# Patient Record
Sex: MALE | Race: WHITE | HISPANIC OR LATINO | URBAN - METROPOLITAN AREA
[De-identification: names, ages, dates, MRNs, and addresses within clinical notes are randomized per-mention and may not be internally consistent; named-entity substitution may affect disease eponyms.]

---

## 2017-08-18 ENCOUNTER — ANESTHESIA EVENT (OUTPATIENT)
Dept: ENDOSCOPY | Facility: HOSPITAL | Age: 37
DRG: 432 | End: 2017-08-18
Payer: MEDICAID

## 2017-08-18 ENCOUNTER — HOSPITAL ENCOUNTER (INPATIENT)
Facility: HOSPITAL | Age: 37
LOS: 3 days | Discharge: HOME | DRG: 432 | End: 2017-08-21
Attending: EMERGENCY MEDICINE | Admitting: INTERNAL MEDICINE
Payer: MEDICAID

## 2017-08-18 ENCOUNTER — ANESTHESIA (OUTPATIENT)
Dept: ENDOSCOPY | Facility: HOSPITAL | Age: 37
DRG: 432 | End: 2017-08-18
Payer: MEDICAID

## 2017-08-18 ENCOUNTER — SURGERY (OUTPATIENT)
Age: 37
End: 2017-08-18

## 2017-08-18 DIAGNOSIS — K92.2 UGIB (UPPER GASTROINTESTINAL BLEED): ICD-10-CM

## 2017-08-18 DIAGNOSIS — K92.2 ACUTE UPPER GI BLEEDING: ICD-10-CM

## 2017-08-18 DIAGNOSIS — I86.4 BLEEDING GASTRIC VARICES: Primary | ICD-10-CM

## 2017-08-18 PROBLEM — E88.09 HYPOALBUMINEMIA: Status: ACTIVE | Noted: 2017-08-18

## 2017-08-18 PROBLEM — F10.10 ALCOHOL ABUSE: Status: ACTIVE | Noted: 2017-08-18

## 2017-08-18 PROBLEM — R73.9 HYPERGLYCEMIA: Status: ACTIVE | Noted: 2017-08-18

## 2017-08-18 PROBLEM — R79.1 ELEVATED INR: Status: ACTIVE | Noted: 2017-08-18

## 2017-08-18 PROBLEM — D62 ACUTE BLOOD LOSS ANEMIA: Status: ACTIVE | Noted: 2017-08-18

## 2017-08-18 PROBLEM — R74.01 ELEVATED AST (SGOT): Status: ACTIVE | Noted: 2017-08-18

## 2017-08-18 LAB
ABO + RH BLD: NORMAL
ALBUMIN SERPL BCP-MCNC: 2.5 G/DL
ALP SERPL-CCNC: 169 U/L
ALT SERPL W/O P-5'-P-CCNC: 24 U/L
ANION GAP SERPL CALC-SCNC: 19 MMOL/L
ANISOCYTOSIS BLD QL SMEAR: SLIGHT
APTT BLDCRRT: 30.4 SEC
AST SERPL-CCNC: 83 U/L
BASOPHILS # BLD AUTO: 0.03 K/UL
BASOPHILS # BLD AUTO: 0.04 K/UL
BASOPHILS # BLD AUTO: ABNORMAL K/UL
BASOPHILS NFR BLD: 0 %
BASOPHILS NFR BLD: 0.2 %
BASOPHILS NFR BLD: 0.2 %
BASOPHILS NFR BLD: 1 %
BILIRUB SERPL-MCNC: 1.3 MG/DL
BLD GP AB SCN CELLS X3 SERPL QL: NORMAL
BLD PROD TYP BPU: NORMAL
BLOOD UNIT EXPIRATION DATE: NORMAL
BLOOD UNIT TYPE CODE: 5100
BLOOD UNIT TYPE: NORMAL
BUN SERPL-MCNC: 10 MG/DL
CALCIUM SERPL-MCNC: 7.6 MG/DL
CHLORIDE SERPL-SCNC: 100 MMOL/L
CO2 SERPL-SCNC: 17 MMOL/L
CODING SYSTEM: NORMAL
CREAT SERPL-MCNC: 1.2 MG/DL
DIFFERENTIAL METHOD: ABNORMAL
DISPENSE STATUS: NORMAL
EOSINOPHIL # BLD AUTO: 0.1 K/UL
EOSINOPHIL # BLD AUTO: 0.2 K/UL
EOSINOPHIL # BLD AUTO: ABNORMAL K/UL
EOSINOPHIL NFR BLD: 0 %
EOSINOPHIL NFR BLD: 0 %
EOSINOPHIL NFR BLD: 0.3 %
EOSINOPHIL NFR BLD: 0.9 %
ERYTHROCYTE [DISTWIDTH] IN BLOOD BY AUTOMATED COUNT: 13.4 %
ERYTHROCYTE [DISTWIDTH] IN BLOOD BY AUTOMATED COUNT: 15.4 %
ERYTHROCYTE [DISTWIDTH] IN BLOOD BY AUTOMATED COUNT: 16.7 %
ERYTHROCYTE [DISTWIDTH] IN BLOOD BY AUTOMATED COUNT: 17.2 %
EST. GFR  (AFRICAN AMERICAN): >60 ML/MIN/1.73 M^2
EST. GFR  (NON AFRICAN AMERICAN): >60 ML/MIN/1.73 M^2
ESTIMATED AVG GLUCOSE: 120 MG/DL
FOLATE SERPL-MCNC: 5.8 NG/ML
GLUCOSE SERPL-MCNC: 230 MG/DL
HBA1C MFR BLD HPLC: 5.8 %
HCT VFR BLD AUTO: 22.5 %
HCT VFR BLD AUTO: 22.6 %
HCT VFR BLD AUTO: 24.1 %
HCT VFR BLD AUTO: 24.9 %
HGB BLD-MCNC: 7.2 G/DL
HGB BLD-MCNC: 7.4 G/DL
HGB BLD-MCNC: 8.1 G/DL
HGB BLD-MCNC: 8.1 G/DL
HYPOCHROMIA BLD QL SMEAR: ABNORMAL
INR PPP: 1.4
LIPASE SERPL-CCNC: 28 U/L
LYMPHOCYTES # BLD AUTO: 2.6 K/UL
LYMPHOCYTES # BLD AUTO: 2.7 K/UL
LYMPHOCYTES # BLD AUTO: ABNORMAL K/UL
LYMPHOCYTES NFR BLD: 11 %
LYMPHOCYTES NFR BLD: 11 %
LYMPHOCYTES NFR BLD: 14.9 %
LYMPHOCYTES NFR BLD: 16.5 %
MCH RBC QN AUTO: 31.9 PG
MCH RBC QN AUTO: 32.5 PG
MCH RBC QN AUTO: 33 PG
MCH RBC QN AUTO: 34.3 PG
MCHC RBC AUTO-ENTMCNC: 31.9 G/DL
MCHC RBC AUTO-ENTMCNC: 32.5 G/DL
MCHC RBC AUTO-ENTMCNC: 32.9 G/DL
MCHC RBC AUTO-ENTMCNC: 33.6 G/DL
MCV RBC AUTO: 100 FL
MCV RBC AUTO: 108 FL
MCV RBC AUTO: 97 FL
MCV RBC AUTO: 98 FL
METAMYELOCYTES NFR BLD MANUAL: 1 %
MONOCYTES # BLD AUTO: 1.4 K/UL
MONOCYTES # BLD AUTO: 1.4 K/UL
MONOCYTES # BLD AUTO: ABNORMAL K/UL
MONOCYTES NFR BLD: 2 %
MONOCYTES NFR BLD: 5 %
MONOCYTES NFR BLD: 8.1 %
MONOCYTES NFR BLD: 8.8 %
MYELOCYTES NFR BLD MANUAL: 1 %
NEUTROPHILS # BLD AUTO: 11.8 K/UL
NEUTROPHILS # BLD AUTO: 13.5 K/UL
NEUTROPHILS NFR BLD: 73.6 %
NEUTROPHILS NFR BLD: 74 %
NEUTROPHILS NFR BLD: 76.5 %
NEUTROPHILS NFR BLD: 79 %
NEUTS BAND NFR BLD MANUAL: 10 %
NEUTS BAND NFR BLD MANUAL: 5 %
PLATELET # BLD AUTO: 114 K/UL
PLATELET # BLD AUTO: 117 K/UL
PLATELET # BLD AUTO: 131 K/UL
PLATELET # BLD AUTO: 228 K/UL
PLATELET BLD QL SMEAR: ABNORMAL
PMV BLD AUTO: 10.1 FL
PMV BLD AUTO: 9.7 FL
PMV BLD AUTO: 9.8 FL
PMV BLD AUTO: 9.9 FL
POCT GLUCOSE: 159 MG/DL (ref 70–110)
POCT GLUCOSE: 197 MG/DL (ref 70–110)
POLYCHROMASIA BLD QL SMEAR: ABNORMAL
POTASSIUM SERPL-SCNC: 4.2 MMOL/L
PROT SERPL-MCNC: 6.1 G/DL
PROTHROMBIN TIME: 14.6 SEC
RBC # BLD AUTO: 2.1 M/UL
RBC # BLD AUTO: 2.24 M/UL
RBC # BLD AUTO: 2.49 M/UL
RBC # BLD AUTO: 2.54 M/UL
SODIUM SERPL-SCNC: 136 MMOL/L
TRANS ERYTHROCYTES VOL PATIENT: NORMAL ML
VIT B12 SERPL-MCNC: 878 PG/ML
WBC # BLD AUTO: 16.02 K/UL
WBC # BLD AUTO: 17.65 K/UL
WBC # BLD AUTO: 21.36 K/UL
WBC # BLD AUTO: 27.75 K/UL

## 2017-08-18 PROCEDURE — 63600175 PHARM REV CODE 636 W HCPCS: Performed by: INTERNAL MEDICINE

## 2017-08-18 PROCEDURE — 80053 COMPREHEN METABOLIC PANEL: CPT

## 2017-08-18 PROCEDURE — 85027 COMPLETE CBC AUTOMATED: CPT | Mod: 91

## 2017-08-18 PROCEDURE — 37000009 HC ANESTHESIA EA ADD 15 MINS: Performed by: INTERNAL MEDICINE

## 2017-08-18 PROCEDURE — 25000003 PHARM REV CODE 250: Performed by: NURSE ANESTHETIST, CERTIFIED REGISTERED

## 2017-08-18 PROCEDURE — 96365 THER/PROPH/DIAG IV INF INIT: CPT

## 2017-08-18 PROCEDURE — 27201022: Performed by: INTERNAL MEDICINE

## 2017-08-18 PROCEDURE — 93010 ELECTROCARDIOGRAM REPORT: CPT | Mod: ,,, | Performed by: INTERNAL MEDICINE

## 2017-08-18 PROCEDURE — 25000003 PHARM REV CODE 250: Performed by: EMERGENCY MEDICINE

## 2017-08-18 PROCEDURE — 25000003 PHARM REV CODE 250: Performed by: INTERNAL MEDICINE

## 2017-08-18 PROCEDURE — C9113 INJ PANTOPRAZOLE SODIUM, VIA: HCPCS | Performed by: INTERNAL MEDICINE

## 2017-08-18 PROCEDURE — 21400001 HC TELEMETRY ROOM

## 2017-08-18 PROCEDURE — 25000003 PHARM REV CODE 250: Performed by: HOSPITALIST

## 2017-08-18 PROCEDURE — 86900 BLOOD TYPING SEROLOGIC ABO: CPT

## 2017-08-18 PROCEDURE — 06L34CZ OCCLUSION OF ESOPHAGEAL VEIN WITH EXTRALUMINAL DEVICE, PERCUTANEOUS ENDOSCOPIC APPROACH: ICD-10-PCS | Performed by: INTERNAL MEDICINE

## 2017-08-18 PROCEDURE — 37000008 HC ANESTHESIA 1ST 15 MINUTES: Performed by: INTERNAL MEDICINE

## 2017-08-18 PROCEDURE — 96375 TX/PRO/DX INJ NEW DRUG ADDON: CPT

## 2017-08-18 PROCEDURE — 36430 TRANSFUSION BLD/BLD COMPNT: CPT

## 2017-08-18 PROCEDURE — 86850 RBC ANTIBODY SCREEN: CPT

## 2017-08-18 PROCEDURE — 96361 HYDRATE IV INFUSION ADD-ON: CPT

## 2017-08-18 PROCEDURE — 99285 EMERGENCY DEPT VISIT HI MDM: CPT | Mod: 25

## 2017-08-18 PROCEDURE — 63600175 PHARM REV CODE 636 W HCPCS: Performed by: HOSPITALIST

## 2017-08-18 PROCEDURE — 86803 HEPATITIS C AB TEST: CPT

## 2017-08-18 PROCEDURE — 86920 COMPATIBILITY TEST SPIN: CPT

## 2017-08-18 PROCEDURE — P9021 RED BLOOD CELLS UNIT: HCPCS

## 2017-08-18 PROCEDURE — 85610 PROTHROMBIN TIME: CPT

## 2017-08-18 PROCEDURE — 82607 VITAMIN B-12: CPT

## 2017-08-18 PROCEDURE — 96368 THER/DIAG CONCURRENT INF: CPT

## 2017-08-18 PROCEDURE — D9220A PRA ANESTHESIA: Mod: CRNA,,, | Performed by: NURSE ANESTHETIST, CERTIFIED REGISTERED

## 2017-08-18 PROCEDURE — C9113 INJ PANTOPRAZOLE SODIUM, VIA: HCPCS | Performed by: EMERGENCY MEDICINE

## 2017-08-18 PROCEDURE — 63600175 PHARM REV CODE 636 W HCPCS: Performed by: EMERGENCY MEDICINE

## 2017-08-18 PROCEDURE — 85730 THROMBOPLASTIN TIME PARTIAL: CPT

## 2017-08-18 PROCEDURE — 85025 COMPLETE CBC W/AUTO DIFF WBC: CPT

## 2017-08-18 PROCEDURE — 82746 ASSAY OF FOLIC ACID SERUM: CPT

## 2017-08-18 PROCEDURE — 36415 COLL VENOUS BLD VENIPUNCTURE: CPT

## 2017-08-18 PROCEDURE — 63600175 PHARM REV CODE 636 W HCPCS: Performed by: NURSE ANESTHETIST, CERTIFIED REGISTERED

## 2017-08-18 PROCEDURE — D9220A PRA ANESTHESIA: Mod: ANES,,, | Performed by: ANESTHESIOLOGY

## 2017-08-18 PROCEDURE — 83690 ASSAY OF LIPASE: CPT

## 2017-08-18 PROCEDURE — 94760 N-INVAS EAR/PLS OXIMETRY 1: CPT

## 2017-08-18 PROCEDURE — 43255 EGD CONTROL BLEEDING ANY: CPT | Performed by: INTERNAL MEDICINE

## 2017-08-18 PROCEDURE — 87340 HEPATITIS B SURFACE AG IA: CPT

## 2017-08-18 PROCEDURE — 85007 BL SMEAR W/DIFF WBC COUNT: CPT | Mod: 91

## 2017-08-18 PROCEDURE — 83036 HEMOGLOBIN GLYCOSYLATED A1C: CPT

## 2017-08-18 RX ORDER — LIDOCAINE HCL/PF 100 MG/5ML
SYRINGE (ML) INTRAVENOUS
Status: DISCONTINUED | OUTPATIENT
Start: 2017-08-18 | End: 2017-08-18

## 2017-08-18 RX ORDER — DEXTROSE MONOHYDRATE AND SODIUM CHLORIDE 5; .9 G/100ML; G/100ML
INJECTION, SOLUTION INTRAVENOUS CONTINUOUS
Status: DISCONTINUED | OUTPATIENT
Start: 2017-08-18 | End: 2017-08-18

## 2017-08-18 RX ORDER — PANTOPRAZOLE SODIUM 40 MG/10ML
80 INJECTION, POWDER, LYOPHILIZED, FOR SOLUTION INTRAVENOUS
Status: COMPLETED | OUTPATIENT
Start: 2017-08-18 | End: 2017-08-18

## 2017-08-18 RX ORDER — SUCRALFATE 1 G/10ML
1 SUSPENSION ORAL EVERY 6 HOURS
Status: DISCONTINUED | OUTPATIENT
Start: 2017-08-18 | End: 2017-08-21 | Stop reason: HOSPADM

## 2017-08-18 RX ORDER — ROCURONIUM BROMIDE 10 MG/ML
INJECTION, SOLUTION INTRAVENOUS
Status: DISPENSED
Start: 2017-08-18 | End: 2017-08-18

## 2017-08-18 RX ORDER — HYDROCODONE BITARTRATE AND ACETAMINOPHEN 500; 5 MG/1; MG/1
TABLET ORAL
Status: DISCONTINUED | OUTPATIENT
Start: 2017-08-18 | End: 2017-08-21 | Stop reason: HOSPADM

## 2017-08-18 RX ORDER — PHENYLEPHRINE HYDROCHLORIDE 10 MG/ML
INJECTION INTRAVENOUS
Status: DISCONTINUED | OUTPATIENT
Start: 2017-08-18 | End: 2017-08-18

## 2017-08-18 RX ORDER — SUCCINYLCHOLINE CHLORIDE 20 MG/ML
INJECTION INTRAMUSCULAR; INTRAVENOUS
Status: DISCONTINUED | OUTPATIENT
Start: 2017-08-18 | End: 2017-08-18

## 2017-08-18 RX ORDER — ONDANSETRON 2 MG/ML
4 INJECTION INTRAMUSCULAR; INTRAVENOUS
Status: COMPLETED | OUTPATIENT
Start: 2017-08-18 | End: 2017-08-18

## 2017-08-18 RX ORDER — ROCURONIUM BROMIDE 10 MG/ML
INJECTION, SOLUTION INTRAVENOUS
Status: DISCONTINUED | OUTPATIENT
Start: 2017-08-18 | End: 2017-08-18

## 2017-08-18 RX ORDER — MIDAZOLAM HYDROCHLORIDE 1 MG/ML
INJECTION INTRAMUSCULAR; INTRAVENOUS
Status: DISPENSED
Start: 2017-08-18 | End: 2017-08-18

## 2017-08-18 RX ORDER — GLYCOPYRROLATE 0.2 MG/ML
INJECTION INTRAMUSCULAR; INTRAVENOUS
Status: DISCONTINUED | OUTPATIENT
Start: 2017-08-18 | End: 2017-08-18

## 2017-08-18 RX ORDER — ETOMIDATE 2 MG/ML
INJECTION INTRAVENOUS
Status: DISCONTINUED | OUTPATIENT
Start: 2017-08-18 | End: 2017-08-18

## 2017-08-18 RX ORDER — NEOSTIGMINE METHYLSULFATE 1 MG/ML
INJECTION, SOLUTION INTRAVENOUS
Status: DISCONTINUED | OUTPATIENT
Start: 2017-08-18 | End: 2017-08-18

## 2017-08-18 RX ORDER — ONDANSETRON 2 MG/ML
8 INJECTION INTRAMUSCULAR; INTRAVENOUS EVERY 8 HOURS PRN
Status: DISCONTINUED | OUTPATIENT
Start: 2017-08-18 | End: 2017-08-21 | Stop reason: HOSPADM

## 2017-08-18 RX ORDER — MIDAZOLAM HYDROCHLORIDE 1 MG/ML
INJECTION, SOLUTION INTRAMUSCULAR; INTRAVENOUS
Status: DISCONTINUED | OUTPATIENT
Start: 2017-08-18 | End: 2017-08-18

## 2017-08-18 RX ORDER — GLYCOPYRROLATE 0.2 MG/ML
INJECTION INTRAMUSCULAR; INTRAVENOUS
Status: DISPENSED
Start: 2017-08-18 | End: 2017-08-18

## 2017-08-18 RX ORDER — SUCRALFATE 1 G/10ML
1 SUSPENSION ORAL
Status: DISCONTINUED | OUTPATIENT
Start: 2017-08-18 | End: 2017-08-18

## 2017-08-18 RX ORDER — DIAZEPAM 10 MG/2ML
5 INJECTION INTRAMUSCULAR EVERY 6 HOURS PRN
Status: DISCONTINUED | OUTPATIENT
Start: 2017-08-18 | End: 2017-08-21 | Stop reason: HOSPADM

## 2017-08-18 RX ORDER — DIPHENHYDRAMINE HYDROCHLORIDE 50 MG/ML
25 INJECTION INTRAMUSCULAR; INTRAVENOUS
Status: COMPLETED | OUTPATIENT
Start: 2017-08-18 | End: 2017-08-18

## 2017-08-18 RX ORDER — LABETALOL HYDROCHLORIDE 5 MG/ML
INJECTION, SOLUTION INTRAVENOUS
Status: DISCONTINUED | OUTPATIENT
Start: 2017-08-18 | End: 2017-08-18

## 2017-08-18 RX ORDER — SUCCINYLCHOLINE CHLORIDE 20 MG/ML
INJECTION INTRAMUSCULAR; INTRAVENOUS
Status: DISPENSED
Start: 2017-08-18 | End: 2017-08-18

## 2017-08-18 RX ORDER — SODIUM CHLORIDE 9 MG/ML
INJECTION, SOLUTION INTRAVENOUS CONTINUOUS PRN
Status: DISCONTINUED | OUTPATIENT
Start: 2017-08-18 | End: 2017-08-18

## 2017-08-18 RX ORDER — ETOMIDATE 2 MG/ML
INJECTION INTRAVENOUS
Status: DISPENSED
Start: 2017-08-18 | End: 2017-08-18

## 2017-08-18 RX ORDER — GLUCAGON 1 MG
1 KIT INJECTION
Status: DISCONTINUED | OUTPATIENT
Start: 2017-08-18 | End: 2017-08-21 | Stop reason: HOSPADM

## 2017-08-18 RX ORDER — LIDOCAINE HYDROCHLORIDE 20 MG/ML
INJECTION, SOLUTION EPIDURAL; INFILTRATION; INTRACAUDAL; PERINEURAL
Status: DISPENSED
Start: 2017-08-18 | End: 2017-08-18

## 2017-08-18 RX ORDER — NEOSTIGMINE METHYLSULFATE 1 MG/ML
INJECTION, SOLUTION INTRAVENOUS
Status: DISPENSED
Start: 2017-08-18 | End: 2017-08-18

## 2017-08-18 RX ORDER — LABETALOL HYDROCHLORIDE 5 MG/ML
INJECTION, SOLUTION INTRAVENOUS
Status: DISPENSED
Start: 2017-08-18 | End: 2017-08-18

## 2017-08-18 RX ORDER — PHENYLEPHRINE HCL IN 0.9% NACL 1 MG/10 ML
SYRINGE (ML) INTRAVENOUS
Status: DISCONTINUED
Start: 2017-08-18 | End: 2017-08-18 | Stop reason: WASHOUT

## 2017-08-18 RX ORDER — PANTOPRAZOLE SODIUM 40 MG/10ML
40 INJECTION, POWDER, LYOPHILIZED, FOR SOLUTION INTRAVENOUS 2 TIMES DAILY
Status: DISCONTINUED | OUTPATIENT
Start: 2017-08-18 | End: 2017-08-21 | Stop reason: HOSPADM

## 2017-08-18 RX ORDER — SUCRALFATE 1 G/10ML
1 SUSPENSION ORAL EVERY 6 HOURS
Status: DISCONTINUED | OUTPATIENT
Start: 2017-08-18 | End: 2017-08-18

## 2017-08-18 RX ORDER — SODIUM CHLORIDE 0.9 % (FLUSH) 0.9 %
3 SYRINGE (ML) INJECTION
Status: DISCONTINUED | OUTPATIENT
Start: 2017-08-18 | End: 2017-08-18

## 2017-08-18 RX ORDER — PHENYLEPHRINE HCL IN 0.9% NACL 1 MG/10 ML
SYRINGE (ML) INTRAVENOUS
Status: DISPENSED
Start: 2017-08-18 | End: 2017-08-18

## 2017-08-18 RX ORDER — SODIUM CHLORIDE 9 MG/ML
INJECTION, SOLUTION INTRAVENOUS CONTINUOUS
Status: DISCONTINUED | OUTPATIENT
Start: 2017-08-18 | End: 2017-08-21 | Stop reason: HOSPADM

## 2017-08-18 RX ORDER — INSULIN ASPART 100 [IU]/ML
0-5 INJECTION, SOLUTION INTRAVENOUS; SUBCUTANEOUS EVERY 6 HOURS PRN
Status: DISCONTINUED | OUTPATIENT
Start: 2017-08-18 | End: 2017-08-21 | Stop reason: HOSPADM

## 2017-08-18 RX ORDER — ONDANSETRON 2 MG/ML
4 INJECTION INTRAMUSCULAR; INTRAVENOUS EVERY 8 HOURS PRN
Status: DISCONTINUED | OUTPATIENT
Start: 2017-08-18 | End: 2017-08-18

## 2017-08-18 RX ADMIN — LIDOCAINE HYDROCHLORIDE 100 MG: 20 INJECTION, SOLUTION INTRAVENOUS at 02:08

## 2017-08-18 RX ADMIN — PHENYLEPHRINE HYDROCHLORIDE 300 MCG: 10 INJECTION INTRAVENOUS at 03:08

## 2017-08-18 RX ADMIN — PHENYLEPHRINE HYDROCHLORIDE 200 MCG: 10 INJECTION INTRAVENOUS at 03:08

## 2017-08-18 RX ADMIN — ROCURONIUM BROMIDE 5 MG: 10 INJECTION, SOLUTION INTRAVENOUS at 02:08

## 2017-08-18 RX ADMIN — SODIUM CHLORIDE: 0.9 INJECTION, SOLUTION INTRAVENOUS at 02:08

## 2017-08-18 RX ADMIN — ETOMIDATE 16 MG: 2 INJECTION, SOLUTION INTRAVENOUS at 02:08

## 2017-08-18 RX ADMIN — ROCURONIUM BROMIDE 5 MG: 10 INJECTION, SOLUTION INTRAVENOUS at 03:08

## 2017-08-18 RX ADMIN — ROCURONIUM BROMIDE 15 MG: 10 INJECTION, SOLUTION INTRAVENOUS at 02:08

## 2017-08-18 RX ADMIN — SODIUM CHLORIDE: 0.9 INJECTION, SOLUTION INTRAVENOUS at 11:08

## 2017-08-18 RX ADMIN — LABETALOL HYDROCHLORIDE 10 MG: 5 INJECTION, SOLUTION INTRAVENOUS at 03:08

## 2017-08-18 RX ADMIN — DIAZEPAM 5 MG: 5 INJECTION, SOLUTION INTRAMUSCULAR; INTRAVENOUS at 11:08

## 2017-08-18 RX ADMIN — SODIUM CHLORIDE: 0.9 INJECTION, SOLUTION INTRAVENOUS at 04:08

## 2017-08-18 RX ADMIN — SODIUM CHLORIDE 1000 ML: 9 INJECTION, SOLUTION INTRAVENOUS at 12:08

## 2017-08-18 RX ADMIN — PANTOPRAZOLE SODIUM 8 MG/HR: 40 INJECTION, POWDER, FOR SOLUTION INTRAVENOUS at 06:08

## 2017-08-18 RX ADMIN — FOLIC ACID: 5 INJECTION, SOLUTION INTRAMUSCULAR; INTRAVENOUS; SUBCUTANEOUS at 09:08

## 2017-08-18 RX ADMIN — PHENYLEPHRINE HYDROCHLORIDE 100 MCG: 10 INJECTION INTRAVENOUS at 03:08

## 2017-08-18 RX ADMIN — MIDAZOLAM HYDROCHLORIDE 2 MG: 1 INJECTION, SOLUTION INTRAMUSCULAR; INTRAVENOUS at 02:08

## 2017-08-18 RX ADMIN — PANTOPRAZOLE SODIUM 80 MG: 40 INJECTION, POWDER, FOR SOLUTION INTRAVENOUS at 01:08

## 2017-08-18 RX ADMIN — SUCRALFATE 1 G: 1 SUSPENSION ORAL at 11:08

## 2017-08-18 RX ADMIN — NEOSTIGMINE METHYLSULFATE 5 MG: 1 INJECTION INTRAVENOUS at 03:08

## 2017-08-18 RX ADMIN — OCTREOTIDE ACETATE 50 MCG/HR: 500 INJECTION, SOLUTION INTRAVENOUS; SUBCUTANEOUS at 02:08

## 2017-08-18 RX ADMIN — PANTOPRAZOLE SODIUM 40 MG: 40 INJECTION, POWDER, FOR SOLUTION INTRAVENOUS at 08:08

## 2017-08-18 RX ADMIN — PANTOPRAZOLE SODIUM 40 MG: 40 INJECTION, POWDER, FOR SOLUTION INTRAVENOUS at 09:08

## 2017-08-18 RX ADMIN — PHENYLEPHRINE HYDROCHLORIDE 400 MCG: 10 INJECTION INTRAVENOUS at 03:08

## 2017-08-18 RX ADMIN — OCTREOTIDE ACETATE 50 MCG/HR: 500 INJECTION, SOLUTION INTRAVENOUS; SUBCUTANEOUS at 01:08

## 2017-08-18 RX ADMIN — SUCRALFATE 1 G: 1 SUSPENSION ORAL at 05:08

## 2017-08-18 RX ADMIN — ONDANSETRON 4 MG: 2 INJECTION INTRAMUSCULAR; INTRAVENOUS at 12:08

## 2017-08-18 RX ADMIN — DIPHENHYDRAMINE HYDROCHLORIDE 25 MG: 50 INJECTION, SOLUTION INTRAMUSCULAR; INTRAVENOUS at 12:08

## 2017-08-18 RX ADMIN — PANTOPRAZOLE SODIUM 8 MG/HR: 40 INJECTION, POWDER, FOR SOLUTION INTRAVENOUS at 04:08

## 2017-08-18 RX ADMIN — SUCCINYLCHOLINE CHLORIDE 120 MG: 20 INJECTION, SOLUTION INTRAMUSCULAR; INTRAVENOUS at 02:08

## 2017-08-18 RX ADMIN — GLYCOPYRROLATE 0.6 MG: 0.2 INJECTION, SOLUTION INTRAMUSCULAR; INTRAVENOUS at 03:08

## 2017-08-18 RX ADMIN — PANTOPRAZOLE SODIUM 8 MG/HR: 40 INJECTION, POWDER, FOR SOLUTION INTRAVENOUS at 01:08

## 2017-08-18 RX ADMIN — CEFTRIAXONE 1 G: 1 INJECTION, SOLUTION INTRAVENOUS at 04:08

## 2017-08-18 RX ADMIN — SUCRALFATE 1 G: 1 SUSPENSION ORAL at 08:08

## 2017-08-18 NOTE — H&P
"[facility] Hospital Medicine  History & Physical    Patient Name: Alcides Mac  MRN: [id number]  Admission Date: 08/18/2017  Attending Physician: Matheus Denton MD, MPH      PCP:     Primary Doctor No    CC:     Chief Complaint   Patient presents with    Hematemesis     x 1 hour, pt reports weak and dizzy, EMS report they saw a dark color in bucket upon arrival        HISTORY OF PRESENT ILLNESS:     Alcides Mac is a 36 y.o. male that (in part)  has no past medical history on file. Presents to [facility] Emergency Department complaining of dizziness, weakness, and fatigue.  The history is otherwise limited due to the condition of the patient due to somnolence after anesthesia from emergent EGD.  History was primarily taken from the emergency department physician who reports the patient presented with acute onset of nausea and hematemesis that began yesterday.  He had several episodes of hematemesis that continue "every 5 minutes".  This is been spontaneous and was not first overall episode.  He does report having at least 2-3 beers per day.  No known history of gastric varices or peptic ulcers.    In the emergency department routine laboratory studies were obtained.  He was also found to be tachycardic and hypotensive.  Subsequently had an episode of hematemesis in the ED.  An OG tube was placed and returned about 150cc dark red blood.  In addition, the patient had about 400cc dark red hematemesis after placement of the G-tube.  He also had an additional 150cc of dark red blood from stool that appeared to be melanotic.  Gastroenterology was emergently consulted and the patient was taken for EGD.  Operative report pending but preliminary thoughts are that the bleeding is due to gastric varices which were banded.    Hospital medicine has been asked to admit for further evaluation and treatment.       REVIEW OF SYSTEMS:     The available review of system is " addressed in the HPI and is otherwise limited due to the condition of the patient status post anesthesia.       PAST MEDICAL / SURGICAL HISTORY:     Unable to obtain due to somnolence status post anesthesia.    FAMILY HISTORY:     Unable to obtain due to somnolence status post anesthesia.      SOCIAL HISTORY:     Social History   Substance Use Topics    Smoking status: Never Smoker    Smokeless tobacco: Never Used    Alcohol use 1.2 - 1.8 oz/week     2 - 3 Cans of beer per week      Comment: per day     Social History     Social History    Marital status:      Spouse name: N/A    Number of children: N/A    Years of education: N/A     Social History Main Topics    Smoking status: Never Smoker    Smokeless tobacco: Never Used    Alcohol use 1.2 - 1.8 oz/week     2 - 3 Cans of beer per week      Comment: per day    Drug use: No    Sexual activity: Not Asked     Other Topics Concern    None     Social History Narrative    None         ALLERGIES:       Review of patient's allergies indicates:  No Known Allergies        HOME MEDICATIONS:     Prior to Admission medications    Not on File      Unable to obtain due to somnolence status post anesthesia.        HOSPITAL MEDICATIONS:     Scheduled Meds:    cefTRIAXone (ROCEPHIN) IVPB  1 g Intravenous Q24H    etomidate        glycopyrrolate        labetalol        lidocaine  20 mg/mL (2%)        midazolam        neostigmine        phenylephrine HCl in 0.9% NaCl        rocuronium        Banana bag   Intravenous Daily    succinylcholine         Continuous Infusions:    sodium chloride 0.9%      dextrose 5 % and 0.9 % NaCl      octreotide (SANDOSTATIN) infusion 50 mcg/hr (08/18/17 020)    pantoprazole 40 mg in dextrose 5 % 100 mL infusion (ready to mix system) 8 mg/hr (08/18/17 7211)     PRN Meds: sodium chloride, dextrose 50%, glucagon (human recombinant), insulin aspart, ondansetron, sodium chloride 0.9%      PHYSICAL EXAM:     Wt Readings from  Last 1 Encounters:   08/18/17 0500 95.6 kg (210 lb 12.2 oz)   08/18/17 0235 113.4 kg (250 lb)   08/18/17 0003 113.4 kg (250 lb)     There is no height or weight on file to calculate BMI.  Vitals:    08/18/17 0500 08/18/17 0506 08/18/17 0521 08/18/17 0530   BP: 110/61 110/63 (!) 107/59    BP Location:       Patient Position:       Pulse: 100 100     Resp: (!) 36 (!) 22     Temp: 99 °F (37.2 °C) 99 °F (37.2 °C) 98.8 °F (37.1 °C)    TempSrc: Oral Oral Oral    SpO2: 100%   100%   Weight: 95.6 kg (210 lb 12.2 oz)             -- General appearance: Early middle-aged  male who is well developed. appears somnolent.  Appears stated age   -- Head: normocephalic, atraumatic   -- Eyes: conjunctivae clear. Extraocular muscles intact  -- Nose: Nares normal. Septum midline.   -- Mouth/Throat: lips, mucosa, and tongue normal. no throat erythema.   -- Neck: supple, symmetrical, trachea midline, no JVD and thyroid not grossly enlarged, appears symmetric  -- Lungs: clear to auscultation bilaterally. normal respiratory effort. No use of accessory muscles.   -- Chest wall: no tenderness. equal bilateral chest rise   -- Heart: Rapid rate and regular rhythm. S1, S2 normal.  no click, rub or gallop   -- Abdomen: Mild epigastric tenderness.  soft, non-distended, non-tympanic; bowel sounds normal; no masses  -- Extremities: no cyanosis, clubbing or edema.   -- Pulses: 2+ and symmetric   -- Skin: color normal, texture normal, turgor normal. No rashes or lesions.   -- Neurologic: Somnolent.  good muscle tone. No focal numbness or weakness. CNII-XII intact.     LABORATORY STUDIES:     Recent Results (from the past 36 hour(s))   CBC auto differential    Collection Time: 08/18/17 12:29 AM   Result Value Ref Range    WBC 27.75 (H) 3.90 - 12.70 K/uL    RBC 2.10 (L) 4.60 - 6.20 M/uL    Hemoglobin 7.2 (L) 14.0 - 18.0 g/dL    Hematocrit 22.6 (L) 40.0 - 54.0 %     (H) 82 - 98 fL    MCH 34.3 (H) 27.0 - 31.0 pg    MCHC 31.9 (L) 32.0 - 36.0  g/dL    RDW 13.4 11.5 - 14.5 %    Platelets 228 150 - 350 K/uL    MPV 9.8 9.2 - 12.9 fL    Gran% 74.0 (H) 38.0 - 73.0 %    Lymph% 11.0 (L) 18.0 - 48.0 %    Mono% 2.0 (L) 4.0 - 15.0 %    Eosinophil% 0.0 0.0 - 8.0 %    Basophil% 1.0 0.0 - 1.9 %    Bands 10.0 %    Metamyelocytes 1.0 %    Myelocytes 1.0 %    Aniso Slight     Poly Occasional     Hypo Occasional     Differential Method Manual    Comprehensive metabolic panel    Collection Time: 08/18/17 12:29 AM   Result Value Ref Range    Sodium 136 136 - 145 mmol/L    Potassium 4.2 3.5 - 5.1 mmol/L    Chloride 100 95 - 110 mmol/L    CO2 17 (L) 23 - 29 mmol/L    Glucose 230 (H) 70 - 110 mg/dL    BUN, Bld 10 6 - 20 mg/dL    Creatinine 1.2 0.5 - 1.4 mg/dL    Calcium 7.6 (L) 8.7 - 10.5 mg/dL    Total Protein 6.1 6.0 - 8.4 g/dL    Albumin 2.5 (L) 3.5 - 5.2 g/dL    Total Bilirubin 1.3 (H) 0.1 - 1.0 mg/dL    Alkaline Phosphatase 169 (H) 55 - 135 U/L    AST 83 (H) 10 - 40 U/L    ALT 24 10 - 44 U/L    Anion Gap 19 (H) 8 - 16 mmol/L    eGFR if African American >60 >60 mL/min/1.73 m^2    eGFR if non African American >60 >60 mL/min/1.73 m^2   Lipase    Collection Time: 08/18/17 12:29 AM   Result Value Ref Range    Lipase 28 4 - 60 U/L   APTT    Collection Time: 08/18/17 12:29 AM   Result Value Ref Range    aPTT 30.4 21.0 - 32.0 sec   Protime-INR    Collection Time: 08/18/17 12:29 AM   Result Value Ref Range    Prothrombin Time 14.6 (H) 9.0 - 12.5 sec    INR 1.4 (H) 0.8 - 1.2   Type & Screen    Collection Time: 08/18/17 12:30 AM   Result Value Ref Range    Group & Rh O POS     Indirect Emelyn NEG    Prepare RBC 2 Units; hemorrhagic shock    Collection Time: 08/18/17 12:30 AM   Result Value Ref Range    UNIT NUMBER [id number]     PRODUCT CODE T0412U19     DISPENSE STATUS ISSUED     CODING SYSTEM LNHF328     Unit Blood Type Code 5100     Unit Blood Type O POS     Unit Expiration [id number]     UNIT NUMBER [id number]     PRODUCT CODE M6151C45     DISPENSE STATUS ISSUED      CODING SYSTEM SBFS883     Unit Blood Type Code 5100     Unit Blood Type O POS     Unit Expiration [id number]        Lab Results   Component Value Date    INR 1.4 (H) 08/18/2017     No results found for: HGBA1C        IMAGING:     Imaging Results          X-Ray Abdomen AP 1 View (Final result)  Result time 08/18/17 02:52:42   Procedure changed from X-Ray Abdomen Flat And Erect     Final result by Hans Alegria MD (08/18/17 02:52:42)                 Impression:        As above.      Electronically signed by: HANS ALEGRIA MD  Date:     08/18/17  Time:    02:52              Narrative:    AP abdomen single view.  Comparison: None.    Enteric tube visualized extending well below the diaphragm.  Nonspecific bowel gas pattern.  No convincing evidence of obstruction.  No free air visualized.                             X-Ray Chest AP Portable (Final result)  Result time 08/18/17 02:06:20   Procedure changed from X-Ray Chest PA And Lateral     Final result by Hans Alegria MD (08/18/17 02:06:20)                 Impression:      No acute cardiopulmonary process identified.      Electronically signed by: HANS ALEGRIA MD  Date:     08/18/17  Time:    02:06              Narrative:    Chest AP single view.  Comparison: None.    Enteric tube extends below the diaphragm outside the field-of-view.  Cardiac silhouette is normal in size.  Lungs are symmetrically expanded.  No evidence of focal consolidative process, pneumothorax, or significant effusion.  Bones appear intact.  No free air visualized beneath the diaphragm.                                CONSULTS:     IP CONSULT TO GI       ASSESSMENT & PLAN:     Primary Diagnosis:  Acute upper GI bleeding    Active Hospital Problems    Diagnosis  POA    *Acute upper GI bleeding [K92.2]  Yes     Priority: 1 - High    Bleeding gastric varices [I86.4]  Yes     Priority: 2     Acute blood loss anemia [D62]  Yes     Priority: 3     Elevated INR [R79.1]  Yes    Elevated AST  (SGOT) [R74.0]  Yes    Hypoalbuminemia [E88.09]  Yes    Hyperglycemia [R73.9]  Yes    Alcohol abuse [F10.10]  Yes      Resolved Hospital Problems    Diagnosis Date Resolved POA   No resolved problems to display.         Alcohol abuse  Suspected chronic alcohol abuse based upon history, elevated AST, gastric varices, and hypoalbuminemia.  · Initiate banana bag  · Case management consult to provide community resources for substance abuse    Acute upper GI bleeding  Acute Upper GI bleeding  · As evidence by physical exam and positive occult blood testing  · Possible etiology includes: hemorrhagic gastritis, ulcer (duodenal / gastric), gastric varices, malignancy, Shruthi Agustin tear  · Monitor with serial H&H  · Give Protonix 80 mg IV bolus followed by 8 mg per hour IV drip  · Sandostatin 50mcg every 8 hours  · Hold all anticoagulation medications  · N.p.o. except  Medications  · Provide IV fluids  · Obtain blood type and screen for transfusion of packed red blood cells  · Maintain two large-bore IVs at all times  · Consulted gastroenterology who performed emergent EGD       Bleeding gastric varices  Pulmonary report from GI indicates this was likely bleeding gastric varices most likely secondary to portal hypertension secondary to cirrhosis of liver secondary to chronic alcohol abuse      Acute blood loss anemia  Due to acute upper GI bleeding as noted above.      Elevated INR  Minimally elevated but likely due to chronic liver disease/cirrhosis.  Likely contributory to his degree of GI bleeding.      Elevated AST (SGOT)  Likely related to chronic alcohol abuse and cirrhosis.      Hypoalbuminemia  Likely malnourished from chronic alcohol use.      Hyperglycemia  Unknown etiology.  Will obtain hemoglobin A1c.  Not a known diabetic.  May have chronic pancreatitis associated with chronic alcohol abuse.          VTE Risk Mitigation     None            Adult PRN medications available   DVT prophylaxis given        DISPOSITION:     Will admit to the Hospital Medicine service for further evaluation and treatment.    Chart reviewed and updated where applicable.    High Risk Conditions:  Patient has a condition that poses threat to life and bodily function: Acute upper GI bleeding      ===============================================================    Matheus Denton MD, MPH  Department of Hospital Medicine   [facility]  [phone] pg  (7pm - 6am)          This note is dictated using Dragon Medical 360 voice recognition software.  There are word recognition mistakes that are occasionally missed on review.

## 2017-08-18 NOTE — ED NOTES
Xrjd and MD at bedside; placement of NG tube being confirmed; MD pulled back NG tube to 60cm and had instant flow of bright red blood to suction; tube secured at nose; will continue to monitor.

## 2017-08-18 NOTE — PROGRESS NOTES
EGD done and report in provation    Impression:    Esophageal varices with evidence of recent bleeding. Banding done.  No lesions in the stomach and  Duodenum    Plans: As per the orders

## 2017-08-18 NOTE — ED NOTES
Pt had large bloody bowel movement. Dr. Desai notified. NG tube placed with large amount of blood returned.

## 2017-08-18 NOTE — TRANSFER OF CARE
Anesthesia Transfer of Care Note    Patient: Alcides Mac    Procedure(s) Performed: Procedure(s) (LRB):  ESOPHAGOGASTRODUODENOSCOPY (EGD) (N/A)    Patient location: GI    Anesthesia Type: general    Transport from OR: Transported from OR on room air with adequate spontaneous ventilation    Post pain: adequate analgesia    Post assessment: no apparent anesthetic complications and tolerated procedure well    Post vital signs: stable    Level of consciousness: awake, alert and oriented    Nausea/Vomiting: no nausea/vomiting    Complications: none    Transfer of care protocol was followed      Last vitals:   Visit Vitals  /64 (BP Location: Right arm, Patient Position: Lying)   Pulse (!) 117   Temp 37.5 °C (99.5 °F)   Resp 18   Wt 113.4 kg (250 lb)   SpO2 100%

## 2017-08-18 NOTE — ED TRIAGE NOTES
Pt seen in ED with complaints of vomiting blood. Pt stated he feels weak and dizzy. EMS reports pt being diaphoretic upon arrival to home. Pt awake, alert but sleepy. Pt hypotensive and tachy, NS bolus running.

## 2017-08-18 NOTE — PLAN OF CARE
[facility] - [place] [address] [facility]  [address]  [place] [address]  Phone: [phone] Fax: [phone]       08/18/17 1225   Discharge Assessment   Assessment Type Discharge Planning Assessment   Confirmed/corrected address and phone number on facesheet? Yes   Assessment information obtained from? Patient   Prior to hospitilization cognitive status: Alert/Oriented   Prior to hospitalization functional status: Independent   Current cognitive status: Alert/Oriented   Current Functional Status: Independent   Arrived From home or self-care   Lives With spouse;child(sveta), dependent   Able to Return to Prior Arrangements yes   Is patient able to care for self after discharge? Yes   How many people do you have in your home that can help with your care after discharge? 1   Who are your caregiver(s) and their phone number(s)? Spouse   Patient's perception of discharge disposition home or selfcare   Readmission Within The Last 30 Days no previous admission in last 30 days   Equipment Currently Used at Home none   Do you have any problems affording any of your prescribed medications? No   Is the patient taking medications as prescribed? yes   Do you have any financial concerns preventing you from receiving the healthcare you need? No   Does the patient have transportation to healthcare appointments? Yes   Transportation Available car;family or friend will provide   On Dialysis? No   Discharge Plan A Home   Discharge Plan B Home   Patient/Family In Agreement With Plan yes

## 2017-08-18 NOTE — ANESTHESIA POSTPROCEDURE EVALUATION
"Anesthesia Post Evaluation    Patient: Alcides Mac    Procedure(s) Performed: Procedure(s) (LRB):  ESOPHAGOGASTRODUODENOSCOPY (EGD) (N/A)    Final Anesthesia Type: general  Patient location during evaluation: GI PACU  Patient participation: Yes- Able to Participate  Level of consciousness: awake and alert and oriented  Post-procedure vital signs: reviewed and stable  Pain management: adequate  Airway patency: patent  PONV status at discharge: No PONV  Anesthetic complications: no      Cardiovascular status: blood pressure returned to baseline and hemodynamically stable  Respiratory status: unassisted, spontaneous ventilation and room air  Hydration status: euvolemic  Follow-up not needed.        Visit Vitals  BP (!) 106/59   Pulse 101   Temp 37 °C (98.6 °F) (Oral)   Resp (!) 32   Ht 5' 6" (1.676 m)   Wt 95.6 kg (210 lb 12.2 oz)   SpO2 99%   BMI 34.02 kg/m²       Pain/Kike Score: Pain Assessment Performed: Yes (8/18/2017  7:00 AM)  Presence of Pain: complains of pain/discomfort (8/18/2017  7:00 AM)  Kike Score: 10 (8/18/2017  4:00 AM)  Modified Kike Score: 20 (8/18/2017  4:15 AM)      "

## 2017-08-18 NOTE — ASSESSMENT & PLAN NOTE
Suspected chronic alcohol abuse based upon history, elevated AST, gastric varices, and hypoalbuminemia.  · Initiate banana bag  · Case management consult to provide community resources for substance abuse

## 2017-08-18 NOTE — ED PROVIDER NOTES
"Encounter Date: 8/18/2017    SCRIBE #1 NOTE: I, AlexHoneyAminah Hernandez , am scribing for, and in the presence of,  Nasir Desai MD . I have scribed the following portions of the note - the EKG reading. Other sections scribed: HPI/ROS/PE .       History     Chief Complaint   Patient presents with    Hematemesis     x 1 hour, pt reports weak and dizzy, EMS report they saw a dark color in bucket upon arrival      CC: Hematemesis     HPI: This 36 y.o. male presents to the ED c/o acute-onset nausea and hematemesis since yesterday. Pt states prior to his arrival, he was "throwing up blood every 5 minutes" and notes his first episode of vomiting did contain blood. No hx of hematemesis. No prior attempted tx. Pt states that he did have 2-3 beers yesterday. No recent sick contacts. He denies a hx of medical problems. Pt also denies fever and chills.         The history is provided by the patient. No  was used.     Review of patient's allergies indicates:  No Known Allergies  History reviewed. No pertinent past medical history.  History reviewed. No pertinent surgical history.  History reviewed. No pertinent family history.  Social History   Substance Use Topics    Smoking status: Never Smoker    Smokeless tobacco: Never Used    Alcohol use 1.2 - 1.8 oz/week     2 - 3 Cans of beer per week      Comment: per day     Review of Systems   Constitutional: Negative for chills and fever.   HENT: Negative for ear pain and sore throat.    Eyes: Negative for pain and visual disturbance.   Respiratory: Negative for cough and shortness of breath.    Cardiovascular: Negative for chest pain.   Gastrointestinal: Positive for nausea and vomiting (hematemesis ). Negative for abdominal pain and diarrhea.   Genitourinary: Negative for difficulty urinating and dysuria.   Musculoskeletal: Negative for back pain and neck pain.   Skin: Negative for rash.   Neurological: Negative for dizziness and headaches.       Physical Exam "     Initial Vitals   BP Pulse Resp Temp SpO2   08/18/17 0003 08/18/17 0003 08/18/17 0003 08/18/17 0007 08/18/17 0003   108/60 (!) 126 18 97.7 °F (36.5 °C) 100 %      MAP       08/18/17 0003       76         Physical Exam    Nursing note and vitals reviewed.  Constitutional: Vital signs are normal. He appears well-developed and well-nourished. He is diaphoretic. He is active.  Non-toxic appearance. He appears distressed.   HENT:   Head: Normocephalic and atraumatic.   Eyes: EOM are normal.   Neck: Trachea normal. Neck supple.   Cardiovascular: Regular rhythm. Tachycardia present.    Pulmonary/Chest: Breath sounds normal. No respiratory distress.   Abdominal: Soft. Normal appearance and bowel sounds are normal. He exhibits no distension. There is no tenderness.   Musculoskeletal: Normal range of motion. He exhibits no edema.   Neurological: He is alert.   Skin: Skin is warm and intact.   Psychiatric: He has a normal mood and affect.         ED Course   Critical Care  Date/Time: 8/18/2017 1:56 AM  Performed by: ELIDA BANERJEE.  Authorized by: ELIDA BANERJEE   Direct patient critical care time: 25 minutes  Additional history critical care time: 10 minutes  Ordering / reviewing critical care time: 10 minutes  Documentation critical care time: 5 minutes  Consulting other physicians critical care time: 10 minutes  Consult with family critical care time: 5 minutes  Total critical care time (exclusive of procedural time) : 65 minutes  Critical care was necessary to treat or prevent imminent or life-threatening deterioration of the following conditions: circulatory failure, shock, renal failure and hepatic failure.  Critical care was time spent personally by me on the following activities: re-evaluation of patient's condition, pulse oximetry, ordering and review of radiographic studies, ordering and review of laboratory studies, ordering and performing treatments and interventions, obtaining history from patient or surrogate,  examination of patient, discussions with consultants, review of old charts, evaluation of patient's response to treatment and gastric intubation.  Comments: OGT placed by me--returned about 150cc dark red blood.  In addition, pt had about 400cc dark red hematemesis after placement.  He also had about 150cc of dark red blood from stool.        Labs Reviewed   CBC W/ AUTO DIFFERENTIAL - Abnormal; Notable for the following:        Result Value    WBC 27.75 (*)     RBC 2.10 (*)     Hemoglobin 7.2 (*)     Hematocrit 22.6 (*)      (*)     MCH 34.3 (*)     MCHC 31.9 (*)     Gran% 74.0 (*)     Lymph% 11.0 (*)     Mono% 2.0 (*)     All other components within normal limits   COMPREHENSIVE METABOLIC PANEL - Abnormal; Notable for the following:     CO2 17 (*)     Glucose 230 (*)     Calcium 7.6 (*)     Albumin 2.5 (*)     Total Bilirubin 1.3 (*)     Alkaline Phosphatase 169 (*)     AST 83 (*)     Anion Gap 19 (*)     All other components within normal limits   PROTIME-INR - Abnormal; Notable for the following:     Prothrombin Time 14.6 (*)     INR 1.4 (*)     All other components within normal limits   LIPASE   APTT   TYPE & SCREEN   PREPARE RBC SOFT     EKG Readings: (Independently Interpreted)   Initial Reading: No STEMI. Rhythm: Sinus Tachycardia. Heart Rate: 112. ST Segments: Normal ST Segments. T Waves: Normal.       X-Rays:   Independently Interpreted Readings:   Abdomen: NGT seen low in stomach.  Pulled back 5 cm. To 60 at nose.     Medical Decision Making:   History:   Old Medical Records: I decided to obtain old medical records.  Initial Assessment:   Massive hematemesis  Differential Diagnosis:   Bleeding ulcer, bleeding esophageal varix, coagulopathy, Shruthi-Agustin tear  Clinical Tests:   Lab Tests: Reviewed and Ordered  Radiological Study: Ordered and Reviewed  Medical Tests: Ordered and Reviewed  ED Management:  This is a previously healthy 36-year-old male complaining of hematemesis that began this  evening.  His vomiting began with hematemesis and was severe.  He felt ill and came to the hospital.  He was initially tachycardic and had one hypotensive blood reading.  He appeared ill.  Also concerned about active bleeding.  Labs and a type and screen were ordered.  During this time the patient had a bloody bowel movement.  I placed a nasogastric tube which returned 150 cc of dark red blood.  The patient vomited through this about 400 cc of bright red blood immediately.  Throughout his ED stay, he put out about 300 cc more of hematemesis.  His blood pressure was labile with numerous hyPOrtensive readings.  Octreotide and protonix were administered.  IV fluids were administered.  2 units of packed red blood cells were ordered and administered.  I discussed this case with gastroenterology, , who requested to activate the endoscopy team.  This was done immediately.     The patient remained guarded throughout his ED stay.  He was transferred to the endoscopy suite.     Additional MDM:   GI Bleeding: NG Tube: NG tube placed - positive for blood (tube left in place). Location of Bleeding: upper GI bleed.   Severity: The patient is orthostatic and requires fluid resuscitation. Consultants: GI Medicine and Internal Medicine. The patient's condition was felt to be guarded.          Scribe Attestation:   Scribe #1: I performed the above scribed service and the documentation accurately describes the services I performed. I attest to the accuracy of the note.    Attending Attestation:           Physician Attestation for Scribe:  Physician Attestation Statement for Scribe #1: I, Nasir Desai MD , reviewed documentation, as scribed by Sharda Hernandez  in my presence, and it is both accurate and complete.                 ED Course     Clinical Impression:   The encounter diagnosis was UGIB (upper gastrointestinal bleed).    Disposition:   Disposition: Admitted  Condition: Stable                        Nasir Desai  MD  08/18/17 0252       Nasir Desai MD  08/18/17 5518

## 2017-08-18 NOTE — HPI
"Alcides Mac is a 36 y.o. male that (in part)  has no past medical history on file. Presents to [facility] Emergency Department complaining of dizziness, weakness, and fatigue.  The history is otherwise limited due to the condition of the patient due to somnolence after anesthesia from emergent EGD.  History was primarily taken from the emergency department physician who reports the patient presented with acute onset of nausea and hematemesis that began yesterday.  He had several episodes of hematemesis that continue "every 5 minutes".  This is been spontaneous and was not first overall episode.  He does report having at least 2-3 beers per day.  No known history of gastric varices or peptic ulcers.    In the emergency department routine laboratory studies were obtained.  He was also found to be tachycardic and hypotensive.  Subsequently had an episode of hematemesis in the ED.  An OG tube was placed and returned about 150cc dark red blood.  In addition, the patient had about 400cc dark red hematemesis after placement of the G-tube.  He also had an additional 150cc of dark red blood from stool that appeared to be melanotic.  Gastroenterology was emergently consulted and the patient was taken for EGD.  Operative report pending but preliminary thoughts are that the bleeding is due to gastric varices which were banded.    Hospital medicine has been asked to admit for further evaluation and treatment.   "

## 2017-08-18 NOTE — ANESTHESIA PREPROCEDURE EVALUATION
08/18/2017  Alcides Mac is a 36 y.o., male without any significant past medical or surgical history presented to the ED with severe upper GI bleeding. The bleeding started today and the patient denied any history or inciting factors. He was acutely anemic from the blood loss and transfusion of RBCs was initiated.   Denies any history of family complications with anesthesia, denies cardiopulmonary disease.  Anesthesia Evaluation    I have reviewed the Patient Summary Reports.     I have reviewed the Medications.     Review of Systems  Anesthesia Hx:  No previous Anesthesia  Denies Family Hx of Anesthesia complications.    Social:  Alcohol Use    Hematology/Oncology:     Oncology Normal    -- Anemia: Hematology Comments: Anemia secondary to acute blood loss    EENT/Dental:EENT/Dental Normal   Cardiovascular:  Cardiovascular Normal Exercise tolerance: good     Pulmonary:  Pulmonary Normal    Renal/:  Renal/ Normal     Hepatic/GI:   Patient presented with an acute upper GI bleed   Neurological:  Neurology Normal    Endocrine:  Endocrine Normal    Psych:  Psychiatric Normal           Physical Exam  General:  Well nourished, Obesity    Airway/Jaw/Neck:  Airway Findings: Mouth Opening: Normal Tongue: Normal  General Airway Assessment: Adult, Average  Mallampati: II  TM Distance: Normal, at least 6 cm  Jaw/Neck Findings:  Neck ROM: Normal ROM      Dental:  Dental Findings: In tact   Chest/Lungs:  Chest/Lungs Findings: Clear to auscultation     Heart/Vascular:  Heart Findings: Rate: Tachycardia  Rhythm: Regular Rhythm        Mental Status:  Mental Status Findings:  Alert and Oriented, Cooperative         Anesthesia Plan  Type of Anesthesia, risks & benefits discussed:  Anesthesia Type:  general  Patient's Preference:   Intra-op Monitoring Plan: standard ASA monitors  Intra-op Monitoring Plan Comments:   Post  Op Pain Control Plan: multimodal analgesia and IV/PO Opioids PRN  Post Op Pain Control Plan Comments:   Induction:   IV  Beta Blocker:  Patient is not currently on a Beta-Blocker (No further documentation required).       Informed Consent: Patient understands risks and agrees with Anesthesia plan.  Questions answered. Anesthesia consent signed with patient.  ASA Score: 3  emergent   Day of Surgery Review of History & Physical:    H&P update referred to the provider.         Ready For Surgery From Anesthesia Perspective.

## 2017-08-18 NOTE — ASSESSMENT & PLAN NOTE
Minimally elevated but likely due to chronic liver disease/cirrhosis.  Likely contributory to his degree of GI bleeding.

## 2017-08-18 NOTE — NURSING TRANSFER
Nursing Transfer Note      8/18/2017     Transfer To: 317    Transfer via wheelchair    Transfer with cardiac monitoring    Transported by RN    Medicines sent: none    Chart send with patient: Yes    Notified: friend, at bedside    Patient reassessed at:  1800    Report given to Ying HAMM on 3rd floor.

## 2017-08-18 NOTE — ASSESSMENT & PLAN NOTE
Acute Upper GI bleeding  · As evidence by physical exam and positive occult blood testing  · Possible etiology includes: hemorrhagic gastritis, ulcer (duodenal / gastric), gastric varices, malignancy, Shruthi Agustin tear  · Monitor with serial H&H  · Give Protonix 80 mg IV bolus followed by 8 mg per hour IV drip  · Sandostatin 50mcg every 8 hours  · Hold all anticoagulation medications  · N.p.o. except  Medications  · Provide IV fluids  · Obtain blood type and screen for transfusion of packed red blood cells  · Maintain two large-bore IVs at all times  · Consulted gastroenterology who performed emergent EGD

## 2017-08-18 NOTE — CONSULTS
Reason for Consult:      Hematemesis    HPI:    Pt is a 36 y.o. year old male came to the emergency room with history of bright red vomiting at home and passing blood per rectum starting at about 6 pm on 8/17/2017.  He vomited blood in the emergency room and had bloody stools. He was hypotensive and tachycardic upon arrival with systolic blood pressure of 80 and heart rate of 130. His HCT was 22.6 on arrival.  His platelet count was 228K.  PT INR was 1.4.  He has been drinking alcohol for many years. No history of abdominal pain or history of GI bleeding or ulcers in the past. NO history of hepatitis or IV drug abuse.  Patient is getting PRBC transfusion.  We are requested to see him for evaluation of GI Bleeding. He was started on Octreotide drip and PPI drip.    History reviewed. No pertinent past medical history. No history of ulcers, hypertension, liver disease or heart trouble.  No history of taking anticoagulants.    History reviewed. No pertinent surgical history. None    Review of patient's allergies indicates: None  No Known Allergies    No current facility-administered medications on file prior to encounter.      No current outpatient prescriptions on file prior to encounter.     Scheduled Meds:   ephedrine        etomidate        glycopyrrolate        lidocaine  20 mg/mL (2%)        midazolam        neostigmine        phenylephrine HCl in 0.9% NaCl        rocuronium        succinylcholine         Continuous Infusions:   octreotide (SANDOSTATIN) infusion 50 mcg/hr (08/18/17 0204)     PRN Meds:.sodium chloride    Social History     Social History    Marital status:      Spouse name: N/A    Number of children: N/A    Years of education: N/A     Occupational History    Not on file.     Social History Main Topics    Smoking status: Never Smoker    Smokeless tobacco: Never Used    Alcohol use 1.2 - 1.8 oz/week     2 - 3 Cans of beer per week      Comment: per day    Drug use: No     Sexual activity: Not on file     Other Topics Concern    Not on file     Social History Narrative    No narrative on file       Family history:  History reviewed. No pertinent family history. No history of liver disease or colon cancer      Endoscopic History:  none    Review of Systems -     Constitutional: no fever or chills  Eyes: no visual changes  ENT: no nasal congestion or sore throat  Respiratory: no cough or shorness of breath  Cardiovascular: no chest pain or palpitations  Gastrointestinal: hematemesis and hematochezia.  Genitourinary: no hematuria or dysuria    Physical Exam -    General: Well developed, well nourished, no apparent distress  Vital Signs Range (Last 24H):  Temp:  [97.7 °F (36.5 °C)-99 °F (37.2 °C)]   Pulse:  [108-130]   Resp:  [16-42]   BP: ()/(46-66)   SpO2:  [98 %-100 %]   HEENT: Anicteric, PERRLA  CVS: S1, S2, no murmers/rubs  Lungs: CTA-B, normal excursion  Abdomen: Soft, NT, ND, normal BS's  Extremities: No c/c/e, 1+ DP pulses to BLE's  Skin: No rashes/lesions.    Labs:    Recent Labs  Lab 08/18/17  0029   CALCIUM 7.6*   PROT 6.1      K 4.2   CO2 17*      BUN 10   CREATININE 1.2   ALKPHOS 169*   ALT 24   AST 83*   BILITOT 1.3*     Recent Results (from the past 336 hour(s))   CBC auto differential    Collection Time: 08/18/17 12:29 AM   Result Value Ref Range    WBC 27.75 (H) 3.90 - 12.70 K/uL    Hemoglobin 7.2 (L) 14.0 - 18.0 g/dL    Hematocrit 22.6 (L) 40.0 - 54.0 %    Platelets 228 150 - 350 K/uL       Recent Labs  Lab 08/18/17  0029   INR 1.4*   APTT 30.4       Imaging:  none    Assessment:    Hematemesis ?? Cause   R/O variceal bleeding v/s PUD   Blood loss anemia  Abnormal liver profile ?/ alcoholic liver disease R/O hepatitis  History of alcohol abuse    Recommendations:  1. EGD now. I have discussed the procedure in detail with the patient and his wife with the help of Janice (Nurse on call for endo who speaks German).  I will further recommendations after  EGD.  Continue with PPI drip and Octreotide.        I would like to take this opportunity to thank you for this consult.  If you have any questions or concerns, please do not hesitate to contact me.       Micha Keenan MD

## 2017-08-18 NOTE — ASSESSMENT & PLAN NOTE
Pulmonary report from GI indicates this was likely bleeding gastric varices most likely secondary to portal hypertension secondary to cirrhosis of liver secondary to chronic alcohol abuse

## 2017-08-18 NOTE — PROGRESS NOTES
"Gastroenterology    CC: Hematemesis    HPI 36 y.o. male with some blood in stool this AM.  No N/V. Some mild upper abd/lower chest discomfort.       History reviewed. No pertinent past medical history.      Review of Systems  General ROS: negative for chills, fever  Cardiovascular ROS: no chest pain or dyspnea     Physical Examination  BP (!) 106/59   Pulse 101   Temp 98.8 °F (37.1 °C) (Oral)   Resp (!) 32   Ht 5' 6" (1.676 m)   Wt 95.6 kg (210 lb 12.2 oz)   SpO2 99%   BMI 34.02 kg/m²   General appearance: alert, cooperative, no distress  HENT: Normocephalic, atraumatic, neck symmetrical, no nasal discharge   Eyes: conjunctivae/corneas clear, no icterus, EOM's intact  Lungs: resp non-labored  Heart: regular rate   Abdomen: soft, ND  Extremities: extremities symmetric; no clubbing, cyanosis  Neurologic: Alert and oriented X 3, MAEW    Labs:  INR 1.4    Assessment:     UGIB secondary to esophageal varices s/p banding overnight last night.  Some blood in stool this AM - hopefully clearance of old blood.     Plan:   - Monitor H/H and for stool output.  Hb goal not above 8  - Daily rocephin  - Cont octreotide  - PPI BID  - carafate  - NPO for now except meds  - Will follow      "

## 2017-08-18 NOTE — ASSESSMENT & PLAN NOTE
Unknown etiology.  Will obtain hemoglobin A1c.  Not a known diabetic.  May have chronic pancreatitis associated with chronic alcohol abuse.

## 2017-08-19 LAB
ALBUMIN SERPL BCP-MCNC: 2.3 G/DL
ALP SERPL-CCNC: 137 U/L
ALT SERPL W/O P-5'-P-CCNC: 26 U/L
ANION GAP SERPL CALC-SCNC: 7 MMOL/L
AST SERPL-CCNC: 131 U/L
BASOPHILS # BLD AUTO: 0.03 K/UL
BASOPHILS # BLD AUTO: 0.04 K/UL
BASOPHILS # BLD AUTO: 0.04 K/UL
BASOPHILS NFR BLD: 0.2 %
BASOPHILS NFR BLD: 0.3 %
BASOPHILS NFR BLD: 0.3 %
BILIRUB SERPL-MCNC: 0.8 MG/DL
BUN SERPL-MCNC: 14 MG/DL
CALCIUM SERPL-MCNC: 6.8 MG/DL
CHLORIDE SERPL-SCNC: 110 MMOL/L
CO2 SERPL-SCNC: 23 MMOL/L
CREAT SERPL-MCNC: 0.9 MG/DL
DIFFERENTIAL METHOD: ABNORMAL
EOSINOPHIL # BLD AUTO: 0.2 K/UL
EOSINOPHIL # BLD AUTO: 0.2 K/UL
EOSINOPHIL # BLD AUTO: 0.3 K/UL
EOSINOPHIL NFR BLD: 1.4 %
EOSINOPHIL NFR BLD: 1.5 %
EOSINOPHIL NFR BLD: 1.8 %
ERYTHROCYTE [DISTWIDTH] IN BLOOD BY AUTOMATED COUNT: 16.8 %
ERYTHROCYTE [DISTWIDTH] IN BLOOD BY AUTOMATED COUNT: 17 %
ERYTHROCYTE [DISTWIDTH] IN BLOOD BY AUTOMATED COUNT: 17.1 %
EST. GFR  (AFRICAN AMERICAN): >60 ML/MIN/1.73 M^2
EST. GFR  (NON AFRICAN AMERICAN): >60 ML/MIN/1.73 M^2
GLUCOSE SERPL-MCNC: 139 MG/DL
HCT VFR BLD AUTO: 24.2 %
HCT VFR BLD AUTO: 25.1 %
HCT VFR BLD AUTO: 25.6 %
HGB BLD-MCNC: 7.9 G/DL
HGB BLD-MCNC: 8.1 G/DL
HGB BLD-MCNC: 8.3 G/DL
LYMPHOCYTES # BLD AUTO: 2 K/UL
LYMPHOCYTES NFR BLD: 14.3 %
LYMPHOCYTES NFR BLD: 14.5 %
LYMPHOCYTES NFR BLD: 14.8 %
MCH RBC QN AUTO: 32 PG
MCH RBC QN AUTO: 32.2 PG
MCH RBC QN AUTO: 32.3 PG
MCHC RBC AUTO-ENTMCNC: 32.3 G/DL
MCHC RBC AUTO-ENTMCNC: 32.4 G/DL
MCHC RBC AUTO-ENTMCNC: 32.6 G/DL
MCV RBC AUTO: 100 FL
MCV RBC AUTO: 98 FL
MCV RBC AUTO: 99 FL
MONOCYTES # BLD AUTO: 1 K/UL
MONOCYTES # BLD AUTO: 1.1 K/UL
MONOCYTES # BLD AUTO: 1.2 K/UL
MONOCYTES NFR BLD: 7.4 %
MONOCYTES NFR BLD: 7.9 %
MONOCYTES NFR BLD: 9.1 %
NEUTROPHILS # BLD AUTO: 10.3 K/UL
NEUTROPHILS # BLD AUTO: 10.6 K/UL
NEUTROPHILS # BLD AUTO: 9.8 K/UL
NEUTROPHILS NFR BLD: 74.1 %
NEUTROPHILS NFR BLD: 75.7 %
NEUTROPHILS NFR BLD: 75.9 %
PLATELET # BLD AUTO: 127 K/UL
PLATELET # BLD AUTO: 135 K/UL
PLATELET # BLD AUTO: 140 K/UL
PMV BLD AUTO: 9.1 FL
PMV BLD AUTO: 9.5 FL
PMV BLD AUTO: 9.8 FL
POCT GLUCOSE: 127 MG/DL (ref 70–110)
POCT GLUCOSE: 143 MG/DL (ref 70–110)
POCT GLUCOSE: 155 MG/DL (ref 70–110)
POCT GLUCOSE: 161 MG/DL (ref 70–110)
POCT GLUCOSE: 165 MG/DL (ref 70–110)
POTASSIUM SERPL-SCNC: 3.9 MMOL/L
PROT SERPL-MCNC: 6.1 G/DL
RBC # BLD AUTO: 2.47 M/UL
RBC # BLD AUTO: 2.51 M/UL
RBC # BLD AUTO: 2.58 M/UL
SODIUM SERPL-SCNC: 140 MMOL/L
WBC # BLD AUTO: 13.22 K/UL
WBC # BLD AUTO: 13.56 K/UL
WBC # BLD AUTO: 14.03 K/UL

## 2017-08-19 PROCEDURE — 82105 ALPHA-FETOPROTEIN SERUM: CPT

## 2017-08-19 PROCEDURE — 80074 ACUTE HEPATITIS PANEL: CPT

## 2017-08-19 PROCEDURE — 25000003 PHARM REV CODE 250: Performed by: HOSPITALIST

## 2017-08-19 PROCEDURE — 63600175 PHARM REV CODE 636 W HCPCS: Performed by: HOSPITALIST

## 2017-08-19 PROCEDURE — 21400001 HC TELEMETRY ROOM

## 2017-08-19 PROCEDURE — 63600175 PHARM REV CODE 636 W HCPCS: Performed by: INTERNAL MEDICINE

## 2017-08-19 PROCEDURE — 63600175 PHARM REV CODE 636 W HCPCS: Performed by: EMERGENCY MEDICINE

## 2017-08-19 PROCEDURE — 36415 COLL VENOUS BLD VENIPUNCTURE: CPT

## 2017-08-19 PROCEDURE — C9113 INJ PANTOPRAZOLE SODIUM, VIA: HCPCS | Performed by: INTERNAL MEDICINE

## 2017-08-19 PROCEDURE — 80053 COMPREHEN METABOLIC PANEL: CPT

## 2017-08-19 PROCEDURE — 25000003 PHARM REV CODE 250: Performed by: INTERNAL MEDICINE

## 2017-08-19 PROCEDURE — 25000003 PHARM REV CODE 250: Performed by: EMERGENCY MEDICINE

## 2017-08-19 PROCEDURE — 85025 COMPLETE CBC W/AUTO DIFF WBC: CPT | Mod: 91

## 2017-08-19 RX ADMIN — SUCRALFATE 1 G: 1 SUSPENSION ORAL at 05:08

## 2017-08-19 RX ADMIN — PANTOPRAZOLE SODIUM 40 MG: 40 INJECTION, POWDER, FOR SOLUTION INTRAVENOUS at 08:08

## 2017-08-19 RX ADMIN — SUCRALFATE 1 G: 1 SUSPENSION ORAL at 11:08

## 2017-08-19 RX ADMIN — SODIUM CHLORIDE: 0.9 INJECTION, SOLUTION INTRAVENOUS at 08:08

## 2017-08-19 RX ADMIN — OCTREOTIDE ACETATE 50 MCG/HR: 500 INJECTION, SOLUTION INTRAVENOUS; SUBCUTANEOUS at 12:08

## 2017-08-19 RX ADMIN — FOLIC ACID: 5 INJECTION, SOLUTION INTRAMUSCULAR; INTRAVENOUS; SUBCUTANEOUS at 08:08

## 2017-08-19 RX ADMIN — CEFTRIAXONE 1 G: 1 INJECTION, SOLUTION INTRAVENOUS at 05:08

## 2017-08-19 RX ADMIN — OCTREOTIDE ACETATE 50 MCG/HR: 500 INJECTION, SOLUTION INTRAVENOUS; SUBCUTANEOUS at 04:08

## 2017-08-19 NOTE — CARE UPDATE
Pt was seen and examined by  this morning. He was admitted with acute upper GIB. Transfused two units overnight and one moire unit today. Hemoglobin 8.  Underwent banding of esophageal varices last night. On PPI IV BID. Continue Octreotide infusion and hold NPO per GI. H/o ETOH use and receiving banana bag and valium PRN DTs. Currently no acute issues.  Follow up:  LFTS elevated - hepatitis panel and abd ultrsound  Hyperglycemia  - HA1c  H/H in am

## 2017-08-19 NOTE — HOSPITAL COURSE
Patient was admitted for evaluation and treatment of upper GI bleed. GI was consulted and the patient underwent emergent EGD. The patient was found to have gastric varices that were banded.  The patient was initially sent to the ICU but went to the floor on 8/18.  The patient was started on an octreotide drip and remained for 72 hours. The patient received blood.  GI continued to follow the patient. The patient was continued on a PPI and octreotide drip. His H/H remained stable.  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 I spoke to Dr. Garcia and he recommended no treatment. Further, he stated the patient can go home on 4 days of cipro, b-blocker and follow up with him in clinic in a week. The patient will be discharged to home today. Activity as tolerated. Diet-low NA. Follow up with PCP and GI in one week.

## 2017-08-19 NOTE — SUBJECTIVE & OBJECTIVE
Interval History: No further hematemesis.      Review of Systems   Constitutional: Negative for activity change, appetite change and fatigue.   Respiratory: Negative for chest tightness and shortness of breath.    Cardiovascular: Negative for chest pain.   Gastrointestinal: Negative for abdominal pain and vomiting.     Objective:     Vital Signs (Most Recent):  Temp: 98.3 °F (36.8 °C) (08/19/17 0810)  Pulse: 94 (08/19/17 0810)  Resp: 16 (08/19/17 0810)  BP: 120/75 (08/19/17 0810)  SpO2: 96 % (08/19/17 0810) Vital Signs (24h Range):  Temp:  [98.3 °F (36.8 °C)-99.8 °F (37.7 °C)] 98.3 °F (36.8 °C)  Pulse:  [93-99] 94  Resp:  [6-39] 16  SpO2:  [95 %-99 %] 96 %  BP: ()/(55-86) 120/75     Weight: 95.1 kg (209 lb 10.5 oz)  Body mass index is 33.84 kg/m².    Intake/Output Summary (Last 24 hours) at 08/19/17 1033  Last data filed at 08/19/17 0515   Gross per 24 hour   Intake           1685.5 ml   Output              400 ml   Net           1285.5 ml      Physical Exam   Constitutional: He is oriented to person, place, and time. He appears well-developed and well-nourished.   HENT:   Head: Normocephalic and atraumatic.   Neurological: He is alert and oriented to person, place, and time.   Psychiatric: He has a normal mood and affect. His behavior is normal.   Vitals reviewed.      Significant Labs:   BMP:   Recent Labs  Lab 08/18/17  0029   *      K 4.2      CO2 17*   BUN 10   CREATININE 1.2   CALCIUM 7.6*     CBC:   Recent Labs  Lab 08/18/17  1550 08/18/17  2337 08/19/17  0804   WBC 17.65* 16.02* 13.22*   HGB 8.1* 8.1* 8.1*   HCT 24.1* 24.9* 25.1*   * 114* 127*       Significant Imaging:

## 2017-08-19 NOTE — PLAN OF CARE
Problem: Fall Risk (Adult)  Goal: Absence of Falls  Patient will demonstrate the desired outcomes by discharge/transition of care.   Outcome: Ongoing (interventions implemented as appropriate)   08/19/17 0357   Fall Risk (Adult)   Absence of Falls making progress toward outcome   Pt did not fall or have any injuries during shift. He ambulated to the bathroom with a steady gait.     Problem: Gastrointestinal Bleeding (Adult)  Goal: Signs and Symptoms of Listed Potential Problems Will be Absent, Minimized or Managed (Gastrointestinal Bleeding)  Signs and symptoms of listed potential problems will be absent, minimized or managed by discharge/transition of care (reference Gastrointestinal Bleeding (Adult) CPG).   Outcome: Ongoing (interventions implemented as appropriate)   08/19/17 0357   Gastrointestinal Bleeding   Problems Assessed (GI Bleeding) all   Problems Present (GI Bleeding) none   Pt had no bloody BM or vomiting overnight.    Problem: Patient Care Overview  Goal: Plan of Care Review  Outcome: Ongoing (interventions implemented as appropriate)   08/19/17 0357   Coping/Psychosocial   Plan Of Care Reviewed With patient   Pt verbalized understanding plan of care. He was calm, cooperative, and accepting.

## 2017-08-19 NOTE — NURSING
08/19/17 1230   Critical Value Communication   Date Result Received 08/19/17   Time Result Received 1231   Resulting Department of Critical Value Lab   Who communicated critical value from resulting department? Jenelle   Critical Test #1 Calcium   Critical Test #1 Result 6.8

## 2017-08-19 NOTE — PROGRESS NOTES
"[facility] Hospital Medicine  Progress Note    Patient Name: Alcides Mac  MRN: [id number]  Patient Class: IP- Inpatient   Admission Date: 8/18/2017  Length of Stay: 1 days  Attending Physician: Malcom Starr MD  Primary Care Provider: Primary Doctor No        Subjective:     Principal Problem:Acute upper GI bleeding    HPI:  Alcides Mac is a 36 y.o. male that (in part)  has no past medical history on file. Presents to [facility] Emergency Department complaining of dizziness, weakness, and fatigue.  The history is otherwise limited due to the condition of the patient due to somnolence after anesthesia from emergent EGD.  History was primarily taken from the emergency department physician who reports the patient presented with acute onset of nausea and hematemesis that began yesterday.  He had several episodes of hematemesis that continue "every 5 minutes".  This is been spontaneous and was not first overall episode.  He does report having at least 2-3 beers per day.  No known history of gastric varices or peptic ulcers.    In the emergency department routine laboratory studies were obtained.  He was also found to be tachycardic and hypotensive.  Subsequently had an episode of hematemesis in the ED.  An OG tube was placed and returned about 150cc dark red blood.  In addition, the patient had about 400cc dark red hematemesis after placement of the G-tube.  He also had an additional 150cc of dark red blood from stool that appeared to be melanotic.  Gastroenterology was emergently consulted and the patient was taken for EGD.  Operative report pending but preliminary thoughts are that the bleeding is due to gastric varices which were banded.    Hospital medicine has been asked to admit for further evaluation and treatment.     Hospital Course:  Patient was admitted for evaluation and treatment of upper GI bleed. GI was consulted and the patient underwent emergent " EGD. The patient was found to have gastric varices that were banded.  The patient was initially sent to the ICU but went to the floor on 8/18.  The patient was started on an octreotide drip and remained for 72 hours. The patient received blood.  GI continued to follow the patient     Interval History: No further hematemesis.      Review of Systems   Constitutional: Negative for activity change, appetite change and fatigue.   Respiratory: Negative for chest tightness and shortness of breath.    Cardiovascular: Negative for chest pain.   Gastrointestinal: Negative for abdominal pain and vomiting.     Objective:     Vital Signs (Most Recent):  Temp: 98.3 °F (36.8 °C) (08/19/17 0810)  Pulse: 94 (08/19/17 0810)  Resp: 16 (08/19/17 0810)  BP: 120/75 (08/19/17 0810)  SpO2: 96 % (08/19/17 0810) Vital Signs (24h Range):  Temp:  [98.3 °F (36.8 °C)-99.8 °F (37.7 °C)] 98.3 °F (36.8 °C)  Pulse:  [93-99] 94  Resp:  [6-39] 16  SpO2:  [95 %-99 %] 96 %  BP: ()/(55-86) 120/75     Weight: 95.1 kg (209 lb 10.5 oz)  Body mass index is 33.84 kg/m².    Intake/Output Summary (Last 24 hours) at 08/19/17 1033  Last data filed at 08/19/17 0515   Gross per 24 hour   Intake           1685.5 ml   Output              400 ml   Net           1285.5 ml      Physical Exam   Constitutional: He is oriented to person, place, and time. He appears well-developed and well-nourished.   HENT:   Head: Normocephalic and atraumatic.   Neurological: He is alert and oriented to person, place, and time.   Psychiatric: He has a normal mood and affect. His behavior is normal.   Vitals reviewed.      Significant Labs:   BMP:   Recent Labs  Lab 08/18/17  0029   *      K 4.2      CO2 17*   BUN 10   CREATININE 1.2   CALCIUM 7.6*     CBC:   Recent Labs  Lab 08/18/17  1550 08/18/17  2337 08/19/17  0804   WBC 17.65* 16.02* 13.22*   HGB 8.1* 8.1* 8.1*   HCT 24.1* 24.9* 25.1*   * 114* 127*       Significant Imaging:     Assessment/Plan:      *  Acute upper GI bleeding    Acute Upper GI bleeding- s/p EGD with gastric varices that were banded. S/p transfusion. H/H stable. GI following. On octreotide drip- through 8/21.          Alcohol abuse    Suspected chronic alcohol abuse based upon history, elevated AST, gastric varices, and hypoalbuminemia.  · Initiate banana bag  · Case management consult to provide community resources for substance abuse        Hyperglycemia    Unknown etiology.  Will obtain hemoglobin A1c.  Not a known diabetic.  May have chronic pancreatitis associated with chronic alcohol abuse.  A1c is normal at 5.8. Likely stress reaction.         Hypoalbuminemia    Likely malnourished from chronic alcohol use.          Elevated AST (SGOT)    Likely related to chronic alcohol abuse and cirrhosis.          Elevated INR    Minimally elevated but likely due to chronic liver disease/cirrhosis.  Likely contributory to his degree of GI bleeding.          Acute blood loss anemia    Due to acute upper GI bleeding as noted above.          Bleeding gastric varices    GI indicates this was likely bleeding gastric varices most likely secondary to portal hypertension secondary to cirrhosis of liver secondary to chronic alcohol abuse            VTE Risk Mitigation     None              Malcom Chase MD  Department of Hospital Medicine   [facility]

## 2017-08-19 NOTE — NURSING
Evaluated general patient appearance/condition. Patient resting quietly, easily aroused per verbal stimuli. Wife at bedside. No apparent distress noted at this time.

## 2017-08-19 NOTE — PROGRESS NOTES
08/19/17 1230 08/19/17 1237   Critical Value Communication   Notified Physician/Designee --  Ro   Date Result Received 08/19/17 --    Time Result Received 1231 --    Resulting Department of Critical Value Lab --    Who communicated critical value from resulting department? Jenelle --    Critical Test #1 Calcium --    Critical Test #1 Result 6.8 --    Date Notified --  08/19/17   Time Notified --  1237   Physician Directive --  Do not call regarding critical value calcium. Value expected in patient with low albumin

## 2017-08-19 NOTE — PLAN OF CARE
Problem: Fall Risk (Adult)  Intervention: Monitor/Assist with Self Care   08/19/17 1655   Activity   Activity Assistance Provided independent   Daily Care Interventions   Self-Care Promotion independence encouraged   Functional Level Current   Ambulation 0 - independent   Transferring 0 - independent   Toileting 0 - independent   Bathing 0 - independent   Dressing 0 - independent   Eating 0 - independent   Communication 0 - understands/communicates without difficulty   Swallowing 0 - swallows foods/liquids without difficulty     Intervention: Patient Rounds   08/19/17 1400   Safety Interventions   Patient Rounds bed in low position;bed wheels locked;call light in reach;clutter free environment maintained;ID band on;visualized patient;placement of personal items at bedside       Goal: Identify Related Risk Factors and Signs and Symptoms  Related risk factors and signs and symptoms are identified upon initiation of Human Response Clinical Practice Guideline (CPG)    08/19/17 1655   Fall Risk   Related Risk Factors (Fall Risk) age-related changes;environment unfamiliar   Signs and Symptoms (Fall Risk) presence of risk factors       Problem: Gastrointestinal Bleeding (Adult)  Intervention: Monitor/Manage Gastrointestinal Bleed Characteristics/Effects   08/19/17 1655   Monitor/Manage Gastrointestinal Bleed Characteristics/Effects   GI Signs/Symptoms no gastrointestinal signs/symptoms   Safety Interventions   Medication Review/Management medications reviewed;infusion initiated     Intervention: Support/Optimize Psychosocial Response to Illness   08/19/17 1655   Coping/Psychosocial Interventions   Supportive Measures active listening utilized;counseling provided;decision-making supported;positive reinforcement provided;relaxation techniques promoted   Psychosocial Support   Family/Support System Care involvement promoted;presence promoted;support provided     Intervention: Elevate Head of Bed 30-45 Degrees   08/19/17 1655    Positioning   Head of Bed (HOB) HOB at 30 degrees     Intervention: Monitor/Manage Fluid Electrolyte Balance   08/19/17 1655   Nutrition Interventions   Fluid/Electrolyte Management fluids provided;intravenous fluids adjusted       Goal: Signs and Symptoms of Listed Potential Problems Will be Absent, Minimized or Managed (Gastrointestinal Bleeding)  Signs and symptoms of listed potential problems will be absent, minimized or managed by discharge/transition of care (reference Gastrointestinal Bleeding (Adult) CPG).   Outcome: Ongoing (interventions implemented as appropriate)   08/19/17 1655   Gastrointestinal Bleeding          08/19/17 1655   Gastrointestinal Bleeding   Problems Assessed (GI Bleeding) all   Problems Present (GI Bleeding) other (see comments)  (black, tarry stools.)       Problem: Communication Impaired, Verbal (Adult,Obstetrics,Pediatric)  Intervention: Improve Effective Communication   08/19/17 1655   Cognitive Interventions   Communication Enhancement Strategies call light answered in person;extra time allowed for response       Goal: Identify Related Risk Factors and Signs and Symptoms  Related risk factors and signs and symptoms are identified upon initiation of Human Response Clinical Practice Guideline (CPG)  Outcome: Ongoing (interventions implemented as appropriate)   08/19/17 1655   Communication Impaired, Verbal   Related Risk Factors (Verbal Communication Impaired) language barrier       Comments: Patient diet advanced to clear liquids, tolerating well. Stools remain black and tarry.

## 2017-08-19 NOTE — PROGRESS NOTES
Pt had one occurrence of soft bloody dark red stool this morning. Pt is not in any distress and denies any pain at this time. Will continue to monitor

## 2017-08-19 NOTE — PROGRESS NOTES
Chief Complaint / Reason for Consult: Hematemesis    No further vomiting, + BM overnight with dark blood present    ROS: No CP, SOB, F/C    Patient Vitals for the past 24 hrs:   BP Temp Temp src Pulse Resp SpO2 Weight   08/19/17 0810 120/75 98.3 °F (36.8 °C) Oral 94 16 96 % -   08/19/17 0514 134/82 98.6 °F (37 °C) Oral 94 19 97 % -   08/19/17 0003 (!) 142/86 98.7 °F (37.1 °C) Oral 93 20 97 % -   08/18/17 1931 - - - - - - 95.1 kg (209 lb 10.5 oz)   08/18/17 1929 117/71 98.8 °F (37.1 °C) Oral 96 (!) 25 95 % -   08/18/17 1805 - - - 93 (!) 28 98 % -   08/18/17 1801 119/77 - - - - - -   08/18/17 1701 118/76 - - 95 (!) 22 97 % -   08/18/17 1615 - - - 94 (!) 24 98 % -   08/18/17 1601 126/83 - - - - - -   08/18/17 1510 - 99.1 °F (37.3 °C) Axillary 94 14 99 % -   08/18/17 1501 (!) 97/55 - - - - - -   08/18/17 1500 - - - 93 (!) 6 98 % -   08/18/17 1403 113/73 - - - - - -   08/18/17 1341 - - - 97 (!) 33 96 % -   08/18/17 1301 113/66 - - - - - -   08/18/17 1300 - 99.6 °F (37.6 °C) Axillary - - - -   08/18/17 1247 - - - 99 (!) 39 99 % -   08/18/17 1201 110/60 - - - - - -   08/18/17 1145 110/60 99.8 °F (37.7 °C) Axillary 97 (!) 30 96 % -   08/18/17 1130 106/62 99.6 °F (37.6 °C) Axillary 96 (!) 29 97 % -   08/18/17 1008 - - - 98 (!) 32 97 % -   08/18/17 1001 106/62 - - - - - -   08/18/17 0903 - - - 101 20 99 % -   08/18/17 0901 (!) 107/56 - - - - - -   08/18/17 0900 - - - 101 17 99 % -       Physical Exam:  Gen - Well developed, well nourished, no apparent distress  HEENT - Anicteric  CV - S1, S2, no murmurs/rubs  Lungs - CTA-B, normal excursion  Abd - Soft, NT, ND, normal BS's, no HSM.  Ext - No c/c/e  Neuro - No asterixis    Labs:    Recent Labs  Lab 08/18/17  2337   WBC 16.02*   RBC 2.54*   HGB 8.1*   HCT 24.9*   *   MCV 98   MCH 31.9*   MCHC 32.5       Imaging:  U/S:  There is hepatic steatosis and flow in the portal vein is not well documented. Dedicated liver Doppler may be of benefit.    There is gallbladder wall  thickening which is a nonspecific finding. There is sludge within the gallbladder.      Assessment:  This patient is a 36 y.o. male with:   1. UGI Bleed - secondary to Esoph Varices, s/p EGD with banding.    2. Anemia - acute post hemorrhagic, stable H/H.  3. EtOH Liver disease    Recommendations:  1. Monitor for sx's of bleeding.  2. Octreotide gtt x 72 hours (total).  3. OK for clear liquids, if H/H remains stable.  4. Check AFP, Viral Hep serologies.    5. Continue with Abx  6. Liver Doppler U/S.    7. EtOH cessation counseling.

## 2017-08-19 NOTE — ASSESSMENT & PLAN NOTE
Acute Upper GI bleeding- s/p EGD with gastric varices that were banded. S/p transfusion. H/H stable. GI following. On octreotide drip- through 8/21.

## 2017-08-19 NOTE — ASSESSMENT & PLAN NOTE
GI indicates this was likely bleeding gastric varices most likely secondary to portal hypertension secondary to cirrhosis of liver secondary to chronic alcohol abuse

## 2017-08-19 NOTE — PROGRESS NOTES
Report received from Jeanie RN, ICU nurse. Tele monitor completed. Assessed pt's general appearance/condition.  Patient resting quietly in bed, respirations even/unlabored, no signs of distress noted, pt denies pain at this time.  Will continue to monitor.

## 2017-08-19 NOTE — PLAN OF CARE
Problem: Patient Care Overview  Goal: Individualization & Mutuality  No further evidence of bleeding. CBC will be rechecked at midnight. Sandastatin infusing. Pt remains NPO. No new injury noted.

## 2017-08-19 NOTE — NURSING
Patient to scheduled procedure as ordered. Patient awake, alert, oriented without c/o discomfort at this time. No apparent distress noted. Accompanied by transport staff, Left unit via wheelchair

## 2017-08-20 LAB
AFP SERPL-MCNC: 2.4 NG/ML
ALBUMIN SERPL BCP-MCNC: 2.3 G/DL
ALP SERPL-CCNC: 141 U/L
ALT SERPL W/O P-5'-P-CCNC: 24 U/L
ANION GAP SERPL CALC-SCNC: 7 MMOL/L
AST SERPL-CCNC: 124 U/L
BASOPHILS # BLD AUTO: 0.02 K/UL
BASOPHILS # BLD AUTO: 0.03 K/UL
BASOPHILS NFR BLD: 0.2 %
BASOPHILS NFR BLD: 0.2 %
BILIRUB SERPL-MCNC: 1 MG/DL
BUN SERPL-MCNC: 7 MG/DL
CALCIUM SERPL-MCNC: 6.9 MG/DL
CHLORIDE SERPL-SCNC: 107 MMOL/L
CO2 SERPL-SCNC: 23 MMOL/L
CREAT SERPL-MCNC: 0.8 MG/DL
DIFFERENTIAL METHOD: ABNORMAL
DIFFERENTIAL METHOD: ABNORMAL
EOSINOPHIL # BLD AUTO: 0.2 K/UL
EOSINOPHIL # BLD AUTO: 0.2 K/UL
EOSINOPHIL NFR BLD: 1.4 %
EOSINOPHIL NFR BLD: 1.5 %
ERYTHROCYTE [DISTWIDTH] IN BLOOD BY AUTOMATED COUNT: 16.6 %
ERYTHROCYTE [DISTWIDTH] IN BLOOD BY AUTOMATED COUNT: 16.9 %
EST. GFR  (AFRICAN AMERICAN): >60 ML/MIN/1.73 M^2
EST. GFR  (NON AFRICAN AMERICAN): >60 ML/MIN/1.73 M^2
GLUCOSE SERPL-MCNC: 118 MG/DL
HCT VFR BLD AUTO: 24.7 %
HCT VFR BLD AUTO: 25.1 %
HGB BLD-MCNC: 8.1 G/DL
HGB BLD-MCNC: 8.2 G/DL
LYMPHOCYTES # BLD AUTO: 1.8 K/UL
LYMPHOCYTES # BLD AUTO: 2.2 K/UL
LYMPHOCYTES NFR BLD: 14 %
LYMPHOCYTES NFR BLD: 15.3 %
MCH RBC QN AUTO: 32.1 PG
MCH RBC QN AUTO: 32.4 PG
MCHC RBC AUTO-ENTMCNC: 32.7 G/DL
MCHC RBC AUTO-ENTMCNC: 32.8 G/DL
MCV RBC AUTO: 98 FL
MCV RBC AUTO: 99 FL
MONOCYTES # BLD AUTO: 1 K/UL
MONOCYTES # BLD AUTO: 1.2 K/UL
MONOCYTES NFR BLD: 7.5 %
MONOCYTES NFR BLD: 8.2 %
NEUTROPHILS # BLD AUTO: 10 K/UL
NEUTROPHILS # BLD AUTO: 10.7 K/UL
NEUTROPHILS NFR BLD: 74.3 %
NEUTROPHILS NFR BLD: 76.8 %
PLATELET # BLD AUTO: 142 K/UL
PLATELET # BLD AUTO: 161 K/UL
PMV BLD AUTO: 8.8 FL
PMV BLD AUTO: 9.3 FL
POCT GLUCOSE: 138 MG/DL (ref 70–110)
POCT GLUCOSE: 140 MG/DL (ref 70–110)
POCT GLUCOSE: 147 MG/DL (ref 70–110)
POTASSIUM SERPL-SCNC: 4 MMOL/L
PROT SERPL-MCNC: 6.2 G/DL
RBC # BLD AUTO: 2.52 M/UL
RBC # BLD AUTO: 2.53 M/UL
SODIUM SERPL-SCNC: 137 MMOL/L
WBC # BLD AUTO: 13 K/UL
WBC # BLD AUTO: 14.33 K/UL

## 2017-08-20 PROCEDURE — 25000003 PHARM REV CODE 250: Performed by: EMERGENCY MEDICINE

## 2017-08-20 PROCEDURE — 25000003 PHARM REV CODE 250: Performed by: INTERNAL MEDICINE

## 2017-08-20 PROCEDURE — 36415 COLL VENOUS BLD VENIPUNCTURE: CPT

## 2017-08-20 PROCEDURE — 80053 COMPREHEN METABOLIC PANEL: CPT

## 2017-08-20 PROCEDURE — 63600175 PHARM REV CODE 636 W HCPCS: Performed by: EMERGENCY MEDICINE

## 2017-08-20 PROCEDURE — 63600175 PHARM REV CODE 636 W HCPCS: Performed by: INTERNAL MEDICINE

## 2017-08-20 PROCEDURE — C9113 INJ PANTOPRAZOLE SODIUM, VIA: HCPCS | Performed by: INTERNAL MEDICINE

## 2017-08-20 PROCEDURE — 25500020 PHARM REV CODE 255: Performed by: INTERNAL MEDICINE

## 2017-08-20 PROCEDURE — 85025 COMPLETE CBC W/AUTO DIFF WBC: CPT

## 2017-08-20 PROCEDURE — 25000003 PHARM REV CODE 250: Performed by: HOSPITALIST

## 2017-08-20 PROCEDURE — 21400001 HC TELEMETRY ROOM

## 2017-08-20 RX ADMIN — SODIUM CHLORIDE: 0.9 INJECTION, SOLUTION INTRAVENOUS at 03:08

## 2017-08-20 RX ADMIN — SUCRALFATE 1 G: 1 SUSPENSION ORAL at 05:08

## 2017-08-20 RX ADMIN — SUCRALFATE 1 G: 1 SUSPENSION ORAL at 11:08

## 2017-08-20 RX ADMIN — CEFTRIAXONE 1 G: 1 INJECTION, SOLUTION INTRAVENOUS at 05:08

## 2017-08-20 RX ADMIN — IOHEXOL 100 ML: 350 INJECTION, SOLUTION INTRAVENOUS at 10:08

## 2017-08-20 RX ADMIN — OCTREOTIDE ACETATE 50 MCG/HR: 500 INJECTION, SOLUTION INTRAVENOUS; SUBCUTANEOUS at 03:08

## 2017-08-20 RX ADMIN — SUCRALFATE 1 G: 1 SUSPENSION ORAL at 12:08

## 2017-08-20 RX ADMIN — SODIUM CHLORIDE: 0.9 INJECTION, SOLUTION INTRAVENOUS at 02:08

## 2017-08-20 RX ADMIN — PANTOPRAZOLE SODIUM 40 MG: 40 INJECTION, POWDER, FOR SOLUTION INTRAVENOUS at 09:08

## 2017-08-20 RX ADMIN — IOHEXOL 15 ML: 300 INJECTION, SOLUTION INTRAVENOUS at 10:08

## 2017-08-20 RX ADMIN — OCTREOTIDE ACETATE 50 MCG/HR: 500 INJECTION, SOLUTION INTRAVENOUS; SUBCUTANEOUS at 02:08

## 2017-08-20 RX ADMIN — SODIUM CHLORIDE: 0.9 INJECTION, SOLUTION INTRAVENOUS at 11:08

## 2017-08-20 RX ADMIN — PANTOPRAZOLE SODIUM 40 MG: 40 INJECTION, POWDER, FOR SOLUTION INTRAVENOUS at 08:08

## 2017-08-20 NOTE — PROGRESS NOTES
Report received from Andrews HAMM. Nursing rounds completed. Assessed pt's general appearance/condition.  Patient resting quietly in bed, respirations even/unlabored, no signs of distress noted, pt denies pain at this time.  Will continue to monitor.

## 2017-08-20 NOTE — NURSING
Patient to scheduled procedure as ordered. Patient awake, alert, oriented without c/o discomfort at this time. No apparent distress noted. Accompanied by transport staff.

## 2017-08-20 NOTE — NURSING
Evaluated general patient appearance/condition. Patient resting quietly, easily aroused per verbal stimuli.  IV infusing. Wife at bedside. Shift assessment completed. No apparent distress noted at this time.

## 2017-08-20 NOTE — SUBJECTIVE & OBJECTIVE
Interval History: No further hematemesis.      Review of Systems   Constitutional: Negative for activity change, appetite change and fatigue.   Respiratory: Negative for chest tightness and shortness of breath.    Cardiovascular: Negative for chest pain.   Gastrointestinal: Negative for abdominal pain and vomiting.     Objective:     Vital Signs (Most Recent):  Temp: 99.6 °F (37.6 °C) (08/20/17 0400)  Pulse: 92 (08/20/17 0400)  Resp: 18 (08/20/17 0400)  BP: 135/82 (08/20/17 0400)  SpO2: 97 % (08/20/17 0400) Vital Signs (24h Range):  Temp:  [98.2 °F (36.8 °C)-99.8 °F (37.7 °C)] 99.6 °F (37.6 °C)  Pulse:  [86-94] 92  Resp:  [16-18] 18  SpO2:  [96 %-98 %] 97 %  BP: (113-147)/(67-89) 135/82     Weight: 96.5 kg (212 lb 11.9 oz)  Body mass index is 34.34 kg/m².    Intake/Output Summary (Last 24 hours) at 08/20/17 0716  Last data filed at 08/20/17 0400   Gross per 24 hour   Intake              840 ml   Output              551 ml   Net              289 ml      Physical Exam   Constitutional: He is oriented to person, place, and time. He appears well-developed and well-nourished.   HENT:   Head: Normocephalic and atraumatic.   Neurological: He is alert and oriented to person, place, and time.   Psychiatric: He has a normal mood and affect. His behavior is normal.   Vitals reviewed.    Interval History: No further hematemesis.      Review of Systems   Constitutional: Negative for activity change, appetite change and fatigue.   Respiratory: Negative for chest tightness and shortness of breath.    Cardiovascular: Negative for chest pain.   Gastrointestinal: Negative for abdominal pain and vomiting.     Objective:     Vital Signs (Most Recent):  Temp: 99.6 °F (37.6 °C) (08/20/17 0400)  Pulse: 92 (08/20/17 0400)  Resp: 18 (08/20/17 0400)  BP: 135/82 (08/20/17 0400)  SpO2: 97 % (08/20/17 0400) Vital Signs (24h Range):  Temp:  [98.2 °F (36.8 °C)-99.8 °F (37.7 °C)] 99.6 °F (37.6 °C)  Pulse:  [86-94] 92  Resp:  [16-18] 18  SpO2:  [96  %-98 %] 97 %  BP: (113-147)/(67-89) 135/82     Weight: 96.5 kg (212 lb 11.9 oz)  Body mass index is 34.34 kg/m².    Intake/Output Summary (Last 24 hours) at 08/20/17 0716  Last data filed at 08/20/17 0400   Gross per 24 hour   Intake              840 ml   Output              551 ml   Net              289 ml      Physical Exam   Constitutional: He is oriented to person, place, and time. He appears well-developed and well-nourished.   HENT:   Head: Normocephalic and atraumatic.   Neurological: He is alert and oriented to person, place, and time.   Psychiatric: He has a normal mood and affect. His behavior is normal.   Vitals reviewed.      Significant Labs:   BMP:     Recent Labs  Lab 08/19/17  1117   *      K 3.9      CO2 23   BUN 14   CREATININE 0.9   CALCIUM 6.8*     CBC:     Recent Labs  Lab 08/19/17  0804 08/19/17  1538 08/19/17  2328   WBC 13.22* 14.03* 13.56*   HGB 8.1* 8.3* 7.9*   HCT 25.1* 25.6* 24.2*   * 140* 135*       Significant Imaging:     Significant Labs:   BMP:     Recent Labs  Lab 08/19/17  1117   *      K 3.9      CO2 23   BUN 14   CREATININE 0.9   CALCIUM 6.8*     CBC:     Recent Labs  Lab 08/19/17  0804 08/19/17  1538 08/19/17  2328   WBC 13.22* 14.03* 13.56*   HGB 8.1* 8.3* 7.9*   HCT 25.1* 25.6* 24.2*   * 140* 135*       Significant Imaging:

## 2017-08-20 NOTE — PROGRESS NOTES
Chief Complaint / Reason for Consult: Hematemesis    No further vomiting, nor melean    ROS: No CP, SOB, F/C    Patient Vitals for the past 24 hrs:   BP Temp Temp src Pulse Resp SpO2 Weight   08/20/17 0400 135/82 99.6 °F (37.6 °C) Oral 92 18 97 % 96.5 kg (212 lb 11.9 oz)   08/19/17 2349 137/87 99.8 °F (37.7 °C) Oral 90 18 97 % -   08/19/17 1934 113/67 99.5 °F (37.5 °C) Oral 92 18 98 % -   08/19/17 1615 (!) 145/89 98.2 °F (36.8 °C) Oral 89 17 98 % -   08/19/17 1138 (!) 147/80 99 °F (37.2 °C) Oral 86 17 97 % -       Physical Exam:  Gen - Well developed, well nourished, no apparent distress  HEENT - Anicteric  CV - S1, S2, no murmurs/rubs  Lungs - CTA-B, normal excursion  Abd - Soft, NT, ND, normal BS's, no HSM.  Ext - No c/c/e  Neuro - No asterixis    Labs:    Recent Labs  Lab 08/20/17  0621   WBC 14.33*   RBC 2.53*   HGB 8.2*   HCT 25.1*      MCV 99*   MCH 32.4*   MCHC 32.7       Recent Labs  Lab 08/20/17  0621   CALCIUM 6.9*   PROT 6.2      K 4.0   CO2 23      BUN 7   CREATININE 0.8   ALKPHOS 141*   ALT 24   *   BILITOT 1.0       Imaging:  U/S Doppler:  Hepatomegaly and concern for stenosis with occlusion of the superior mesenteric, main portal, and posterior right portal veins with reversal flow in the left and splenic veins. Dedicated cross-sectional liver imaging with either multiphasic CT or MRI recommended for further evaluation when the patient's condition permits.    Nonocclusive IVC thrombus.    No discrete solid hepatic masses, although there are geographic hypoechoic regions near the gallbladder fossa and falciform ligament which may indicate fatty sparing.    Assessment:  This patient is a 36 y.o. male with:   1. UGI Bleed - secondary to Esoph Varices, s/p EGD with banding.    2. Anemia - acute post hemorrhagic, stable H/H.  3. EtOH Liver disease - U/S with doppler concerning for stenosis occlusion of the PV's.      Recommendations:  1. Monitor for sx's of bleeding.  2.  Octreotide gtt x 72 hours (total), OK to D/C tomorrow AM.  3. OK to advance diet, if H/H remains stable.  4. F/U with AFP, Viral Hep serologies.    5. Continue with Abx.  6. CT Triple Phase of Liver.    7. EtOH cessation counseling.

## 2017-08-20 NOTE — PROGRESS NOTES
"[facility] Hospital Medicine  Progress Note    Patient Name: Alcidse Mac  MRN: [id number]  Patient Class: IP- Inpatient   Admission Date: 8/18/2017  Length of Stay: 2 days  Attending Physician: Malcom Starr MD  Primary Care Provider: Primary Doctor No        Subjective:     Principal Problem:Acute upper GI bleeding    HPI:  Alcides Mac is a 36 y.o. male that (in part)  has no past medical history on file. Presents to [facility] Emergency Department complaining of dizziness, weakness, and fatigue.  The history is otherwise limited due to the condition of the patient due to somnolence after anesthesia from emergent EGD.  History was primarily taken from the emergency department physician who reports the patient presented with acute onset of nausea and hematemesis that began yesterday.  He had several episodes of hematemesis that continue "every 5 minutes".  This is been spontaneous and was not first overall episode.  He does report having at least 2-3 beers per day.  No known history of gastric varices or peptic ulcers.    In the emergency department routine laboratory studies were obtained.  He was also found to be tachycardic and hypotensive.  Subsequently had an episode of hematemesis in the ED.  An OG tube was placed and returned about 150cc dark red blood.  In addition, the patient had about 400cc dark red hematemesis after placement of the G-tube.  He also had an additional 150cc of dark red blood from stool that appeared to be melanotic.  Gastroenterology was emergently consulted and the patient was taken for EGD.  Operative report pending but preliminary thoughts are that the bleeding is due to gastric varices which were banded.    Hospital medicine has been asked to admit for further evaluation and treatment.     Hospital Course:  Patient was admitted for evaluation and treatment of upper GI bleed. GI was consulted and the patient underwent emergent " EGD. The patient was found to have gastric varices that were banded.  The patient was initially sent to the ICU but went to the floor on 8/18.  The patient was started on an octreotide drip and remained for 72 hours. The patient received blood.  GI continued to follow the patient     Interval History: No further hematemesis.      Review of Systems   Constitutional: Negative for activity change, appetite change and fatigue.   Respiratory: Negative for chest tightness and shortness of breath.    Cardiovascular: Negative for chest pain.   Gastrointestinal: Negative for abdominal pain and vomiting.     Objective:     Vital Signs (Most Recent):  Temp: 99.6 °F (37.6 °C) (08/20/17 0400)  Pulse: 92 (08/20/17 0400)  Resp: 18 (08/20/17 0400)  BP: 135/82 (08/20/17 0400)  SpO2: 97 % (08/20/17 0400) Vital Signs (24h Range):  Temp:  [98.2 °F (36.8 °C)-99.8 °F (37.7 °C)] 99.6 °F (37.6 °C)  Pulse:  [86-94] 92  Resp:  [16-18] 18  SpO2:  [96 %-98 %] 97 %  BP: (113-147)/(67-89) 135/82     Weight: 96.5 kg (212 lb 11.9 oz)  Body mass index is 34.34 kg/m².    Intake/Output Summary (Last 24 hours) at 08/20/17 0716  Last data filed at 08/20/17 0400   Gross per 24 hour   Intake              840 ml   Output              551 ml   Net              289 ml      Physical Exam   Constitutional: He is oriented to person, place, and time. He appears well-developed and well-nourished.   HENT:   Head: Normocephalic and atraumatic.   Neurological: He is alert and oriented to person, place, and time.   Psychiatric: He has a normal mood and affect. His behavior is normal.   Vitals reviewed.    Interval History: No further hematemesis.      Review of Systems   Constitutional: Negative for activity change, appetite change and fatigue.   Respiratory: Negative for chest tightness and shortness of breath.    Cardiovascular: Negative for chest pain.   Gastrointestinal: Negative for abdominal pain and vomiting.     Objective:     Vital Signs (Most  Recent):  Temp: 99.6 °F (37.6 °C) (08/20/17 0400)  Pulse: 92 (08/20/17 0400)  Resp: 18 (08/20/17 0400)  BP: 135/82 (08/20/17 0400)  SpO2: 97 % (08/20/17 0400) Vital Signs (24h Range):  Temp:  [98.2 °F (36.8 °C)-99.8 °F (37.7 °C)] 99.6 °F (37.6 °C)  Pulse:  [86-94] 92  Resp:  [16-18] 18  SpO2:  [96 %-98 %] 97 %  BP: (113-147)/(67-89) 135/82     Weight: 96.5 kg (212 lb 11.9 oz)  Body mass index is 34.34 kg/m².    Intake/Output Summary (Last 24 hours) at 08/20/17 0716  Last data filed at 08/20/17 0400   Gross per 24 hour   Intake              840 ml   Output              551 ml   Net              289 ml      Physical Exam   Constitutional: He is oriented to person, place, and time. He appears well-developed and well-nourished.   HENT:   Head: Normocephalic and atraumatic.   Neurological: He is alert and oriented to person, place, and time.   Psychiatric: He has a normal mood and affect. His behavior is normal.   Vitals reviewed.      Significant Labs:   BMP:     Recent Labs  Lab 08/19/17  1117   *      K 3.9      CO2 23   BUN 14   CREATININE 0.9   CALCIUM 6.8*     CBC:     Recent Labs  Lab 08/19/17  0804 08/19/17  1538 08/19/17  2328   WBC 13.22* 14.03* 13.56*   HGB 8.1* 8.3* 7.9*   HCT 25.1* 25.6* 24.2*   * 140* 135*       Significant Imaging:     Significant Labs:   BMP:     Recent Labs  Lab 08/19/17  1117   *      K 3.9      CO2 23   BUN 14   CREATININE 0.9   CALCIUM 6.8*     CBC:     Recent Labs  Lab 08/19/17  0804 08/19/17  1538 08/19/17  2328   WBC 13.22* 14.03* 13.56*   HGB 8.1* 8.3* 7.9*   HCT 25.1* 25.6* 24.2*   * 140* 135*       Significant Imaging:     Assessment/Plan:      * Acute upper GI bleeding    Acute Upper GI bleeding- s/p EGD with gastric varices that were banded. S/p transfusion. H/H stable. GI following. On octreotide drip- through 8/21.          Alcohol abuse    Suspected chronic alcohol abuse based upon history, elevated AST, gastric varices,  and hypoalbuminemia.  · Initiate banana bag  · Case management consult to provide community resources for substance abuse        Hyperglycemia    Unknown etiology.  Will obtain hemoglobin A1c.  Not a known diabetic.  May have chronic pancreatitis associated with chronic alcohol abuse.          Hypoalbuminemia    Likely malnourished from chronic alcohol use.          Elevated AST (SGOT)    Likely related to chronic alcohol abuse and cirrhosis.          Elevated INR    Minimally elevated but likely due to chronic liver disease/cirrhosis.  Likely contributory to his degree of GI bleeding.          Acute blood loss anemia    Due to acute upper GI bleeding as noted above.          Bleeding gastric varices    GI indicates this was likely bleeding gastric varices most likely secondary to portal hypertension secondary to cirrhosis of liver secondary to chronic alcohol abuse            VTE Risk Mitigation     None        Continue octreotide drip.      Malcom Chase MD  Department of Hospital Medicine   [facility]

## 2017-08-20 NOTE — NURSING
Patient returned to room from scheduled procedure. Patient awake, alert, oriented without c/o discomfort. Tele monitoring in progress. No apparent distress noted at this time.

## 2017-08-20 NOTE — PLAN OF CARE
Problem: Fall Risk (Adult)  Goal: Absence of Falls  Patient will demonstrate the desired outcomes by discharge/transition of care.   Outcome: Ongoing (interventions implemented as appropriate)   08/20/17 0109   Fall Risk (Adult)   Absence of Falls making progress toward outcome       Problem: Gastrointestinal Bleeding (Adult)  Goal: Signs and Symptoms of Listed Potential Problems Will be Absent, Minimized or Managed (Gastrointestinal Bleeding)  Signs and symptoms of listed potential problems will be absent, minimized or managed by discharge/transition of care (reference Gastrointestinal Bleeding (Adult) CPG).   Outcome: Ongoing (interventions implemented as appropriate)   08/20/17 0109   Gastrointestinal Bleeding   Problems Assessed (GI Bleeding) all   Problems Present (GI Bleeding) none       Problem: Patient Care Overview  Goal: Plan of Care Review  Outcome: Ongoing (interventions implemented as appropriate)   08/20/17 0109   Coping/Psychosocial   Plan Of Care Reviewed With patient   Pt H/H 7.4 and 24.2. Pt still on Sandostatin and on Rocephin. Pt has not any bloody stools during the shift. He is resting comfortably in bed with no c/o pain or distress. He is tolerating clear liquids and ambulated to the bathroom independently.     Problem: Communication Impaired, Verbal (Adult,Obstetrics,Pediatric)  Goal: Improved/Effective Communication  Patient will demonstrate the desired outcomes by discharge/transition of care.   Outcome: Ongoing (interventions implemented as appropriate)   08/20/17 0109   Communication Impaired, Verbal (Adult,Obstetrics,Pediatric)   Improved/Effective Communication making progress toward outcome

## 2017-08-20 NOTE — PLAN OF CARE
Problem: Gastrointestinal Bleeding (Adult)  Intervention: Monitor/Manage Gastrointestinal Bleed Characteristics/Effects   08/20/17 1449   Monitor/Manage Gastrointestinal Bleed Characteristics/Effects   GI Signs/Symptoms no gastrointestinal signs/symptoms   Safety Interventions   Medication Review/Management medications reviewed;infusion initiated     Intervention: Support/Optimize Psychosocial Response to Illness   08/20/17 1449   Coping/Psychosocial Interventions   Supportive Measures active listening utilized;counseling provided;decision-making supported;self-reflection promoted;relaxation techniques promoted;positive reinforcement provided;self-care encouraged;self-responsibility promoted;verbalization of feelings encouraged   Psychosocial Support   Family/Support System Care presence promoted     Intervention: Elevate Head of Bed 30-45 Degrees   08/20/17 1449   Positioning   Head of Bed (HOB) HOB at 30-45 degrees   Safety Interventions   Aspiration Precautions upright posture maintained     Intervention: Monitor/Manage Fluid Electrolyte Balance   08/20/17 1449   Nutrition Interventions   Fluid/Electrolyte Management fluids provided       Goal: Signs and Symptoms of Listed Potential Problems Will be Absent, Minimized or Managed (Gastrointestinal Bleeding)  Signs and symptoms of listed potential problems will be absent, minimized or managed by discharge/transition of care (reference Gastrointestinal Bleeding (Adult) CPG).   Outcome: Ongoing (interventions implemented as appropriate)   08/20/17 1449   Gastrointestinal Bleeding   Problems Assessed (GI Bleeding) all   Problems Present (GI Bleeding) none       Problem: Communication Impaired, Verbal (Adult,Obstetrics,Pediatric)  Intervention: Improve Effective Communication   08/20/17 1449   Cognitive Interventions   Communication Enhancement Strategies call light answered in person;device use encouraged;repetition utilized       Goal: Identify Related Risk Factors  and Signs and Symptoms  Related risk factors and signs and symptoms are identified upon initiation of Human Response Clinical Practice Guideline (CPG)   Outcome: Ongoing (interventions implemented as appropriate)   08/20/17 1449   Communication Impaired, Verbal   Related Risk Factors (Verbal Communication Impaired) language barrier     Goal: Improved/Effective Communication  Patient will demonstrate the desired outcomes by discharge/transition of care.    08/20/17 1449   Communication Impaired, Verbal (Adult,Obstetrics,Pediatric)   Improved/Effective Communication making progress toward outcome       Comments:  use encouraged. Pt. declines.

## 2017-08-21 VITALS
HEART RATE: 93 BPM | SYSTOLIC BLOOD PRESSURE: 135 MMHG | TEMPERATURE: 100 F | HEIGHT: 66 IN | RESPIRATION RATE: 18 BRPM | DIASTOLIC BLOOD PRESSURE: 82 MMHG | OXYGEN SATURATION: 95 % | WEIGHT: 212.75 LBS | BODY MASS INDEX: 34.19 KG/M2

## 2017-08-21 PROBLEM — I81 PORTAL VEIN THROMBOSIS: Status: ACTIVE | Noted: 2017-08-21

## 2017-08-21 PROBLEM — K92.2 ACUTE UPPER GI BLEEDING: Status: RESOLVED | Noted: 2017-08-18 | Resolved: 2017-08-21

## 2017-08-21 PROBLEM — D62 ACUTE BLOOD LOSS ANEMIA: Status: RESOLVED | Noted: 2017-08-18 | Resolved: 2017-08-21

## 2017-08-21 PROBLEM — R73.9 HYPERGLYCEMIA: Status: RESOLVED | Noted: 2017-08-18 | Resolved: 2017-08-21

## 2017-08-21 LAB
ALBUMIN SERPL BCP-MCNC: 2.2 G/DL
ALP SERPL-CCNC: 132 U/L
ALT SERPL W/O P-5'-P-CCNC: 22 U/L
ANION GAP SERPL CALC-SCNC: 5 MMOL/L
AST SERPL-CCNC: 86 U/L
BASOPHILS # BLD AUTO: 0.04 K/UL
BASOPHILS NFR BLD: 0.3 %
BILIRUB SERPL-MCNC: 1.3 MG/DL
BUN SERPL-MCNC: 4 MG/DL
CALCIUM SERPL-MCNC: 6.9 MG/DL
CHLORIDE SERPL-SCNC: 107 MMOL/L
CO2 SERPL-SCNC: 23 MMOL/L
CREAT SERPL-MCNC: 0.7 MG/DL
DIFFERENTIAL METHOD: ABNORMAL
EOSINOPHIL # BLD AUTO: 0.2 K/UL
EOSINOPHIL NFR BLD: 1.8 %
ERYTHROCYTE [DISTWIDTH] IN BLOOD BY AUTOMATED COUNT: 16.9 %
EST. GFR  (AFRICAN AMERICAN): >60 ML/MIN/1.73 M^2
EST. GFR  (NON AFRICAN AMERICAN): >60 ML/MIN/1.73 M^2
GLUCOSE SERPL-MCNC: 102 MG/DL
HAV IGM SERPL QL IA: NEGATIVE
HBV CORE IGM SERPL QL IA: NEGATIVE
HBV SURFACE AG SERPL QL IA: NEGATIVE
HBV SURFACE AG SERPL QL IA: NEGATIVE
HCT VFR BLD AUTO: 25 %
HCV AB SERPL QL IA: NEGATIVE
HCV AB SERPL QL IA: NEGATIVE
HGB BLD-MCNC: 8.2 G/DL
LYMPHOCYTES # BLD AUTO: 1.6 K/UL
LYMPHOCYTES NFR BLD: 12 %
MCH RBC QN AUTO: 32.4 PG
MCHC RBC AUTO-ENTMCNC: 32.8 G/DL
MCV RBC AUTO: 99 FL
MONOCYTES # BLD AUTO: 1.4 K/UL
MONOCYTES NFR BLD: 10.5 %
NEUTROPHILS # BLD AUTO: 9.8 K/UL
NEUTROPHILS NFR BLD: 75.4 %
PLATELET # BLD AUTO: 153 K/UL
PMV BLD AUTO: 9 FL
POCT GLUCOSE: 106 MG/DL (ref 70–110)
POCT GLUCOSE: 110 MG/DL (ref 70–110)
POTASSIUM SERPL-SCNC: 3.9 MMOL/L
PROT SERPL-MCNC: 5.9 G/DL
RBC # BLD AUTO: 2.53 M/UL
SODIUM SERPL-SCNC: 135 MMOL/L
WBC # BLD AUTO: 12.98 K/UL

## 2017-08-21 PROCEDURE — 63600175 PHARM REV CODE 636 W HCPCS: Performed by: EMERGENCY MEDICINE

## 2017-08-21 PROCEDURE — 85025 COMPLETE CBC W/AUTO DIFF WBC: CPT

## 2017-08-21 PROCEDURE — 25000003 PHARM REV CODE 250: Performed by: INTERNAL MEDICINE

## 2017-08-21 PROCEDURE — 63600175 PHARM REV CODE 636 W HCPCS: Performed by: INTERNAL MEDICINE

## 2017-08-21 PROCEDURE — C9113 INJ PANTOPRAZOLE SODIUM, VIA: HCPCS | Performed by: INTERNAL MEDICINE

## 2017-08-21 PROCEDURE — 36415 COLL VENOUS BLD VENIPUNCTURE: CPT

## 2017-08-21 PROCEDURE — 80053 COMPREHEN METABOLIC PANEL: CPT

## 2017-08-21 PROCEDURE — 25000003 PHARM REV CODE 250: Performed by: EMERGENCY MEDICINE

## 2017-08-21 RX ORDER — CIPROFLOXACIN 500 MG/1
500 TABLET ORAL EVERY 12 HOURS
Qty: 10 TABLET | Refills: 0 | Status: SHIPPED | OUTPATIENT
Start: 2017-08-21 | End: 2017-08-26

## 2017-08-21 RX ORDER — CARVEDILOL 3.12 MG/1
3.12 TABLET ORAL 2 TIMES DAILY WITH MEALS
Qty: 60 TABLET | Refills: 5 | Status: ON HOLD | OUTPATIENT
Start: 2017-08-21 | End: 2017-09-08 | Stop reason: HOSPADM

## 2017-08-21 RX ADMIN — OCTREOTIDE ACETATE 50 MCG/HR: 500 INJECTION, SOLUTION INTRAVENOUS; SUBCUTANEOUS at 03:08

## 2017-08-21 RX ADMIN — PANTOPRAZOLE SODIUM 40 MG: 40 INJECTION, POWDER, FOR SOLUTION INTRAVENOUS at 08:08

## 2017-08-21 RX ADMIN — SUCRALFATE 1 G: 1 SUSPENSION ORAL at 05:08

## 2017-08-21 NOTE — PROGRESS NOTES
Follow-up Information     [facility] Emergency Department. Go on 9/5/2017.    Why:  Outpatient Services, PCP Follow-up Appointment, Please arrive to clinic for 12:40PM  Contact information:  [address]  [facility] [address]  [phone]             [facility].    Specialties:  Neurosurgery, Plastic Surgery, Podiatry, Surgical Oncology, Allergy, Cardiothoracic Surgery, Otolaryngology, Gastroenterology, Breast Surgery, Oral Surgery, Oral and Maxillofacial Surgery, Cardiology, Bariatrics, Internal Medicine, Family Medicine, Colon and Rectal Surgery, Dental General Practice, Gynecology, Orthopedic Surgery, Genetics, Endocrinology, Vascular Surgery, Physical Medicine and Rehabilitation, Urology, Neurology, Dermatology, Rheumatology, Occupational Therapy  Why:  GI Follow-up Appointment, Clinic will contact you directly with appointment date/time, You will need to bring ID and Proof of Residency  Contact information:  [address] [facility] [address]  [phone]                   [facility]    WRITTEN HEALTHCARE AND DISCHARGE INFORMATION                            Help at Home           [phone]  After discharge for assistance [facility] On Call Nurse Care Line 24/7  Assistance    Things You are responsible For To Manage Your Care At Home:  1.    Getting your prescriptions filled   2.    Taking your medications as directed, DO NOT MISS ANY DOSES!  3.    Going to your follow-up doctor appointment. This is important because it  allow the doctor to monitor your progress and determine if  any changes need to made to your treatment plan.     Thank you for choosing [facility] for your care.  Please answer any calls you may receive from [facility] we want to continue to support you as you manage your healthcare needs. [facility] is happy to have the opportunity to serve you.     Sincerely,  Your [facility] Team,  Marian Spain LMSW   II  [phone]

## 2017-08-21 NOTE — PROGRESS NOTES
The patient denies having any bleeding overnight.    Vitals:    08/20/17 0802 08/20/17 1944 08/21/17 0000 08/21/17 0400   BP: 117/80 (!) 140/88 138/83 (!) 144/95   Pulse: 92 81 89 87   Resp: 18 18 18 18   Temp: 98.9 °F (37.2 °C) 99.1 °F (37.3 °C) 99.1 °F (37.3 °C) 98.9 °F (37.2 °C)   TempSrc: Oral Oral Oral Oral   SpO2: 97% 98% 97% 96%   Weight:       Height:          pantoprazole  40 mg Intravenous BID    sucralfate  1 g Oral Q6H     P.E.:  GEN: A x O x 3, NAD  HEENT: EOMI, PERRL, anicteric sclera  CV: RRR, no M/R/G  Chest: CTA B  Abdomen: soft, NTND, normoactive BS  Ext: No C/C/E. 2+ dorsalis pedis pulses B    Labs:  Recent Results (from the past 336 hour(s))   CBC auto differential    Collection Time: 08/20/17  4:10 PM   Result Value Ref Range    WBC 13.00 (H) 3.90 - 12.70 K/uL    Hemoglobin 8.1 (L) 14.0 - 18.0 g/dL    Hematocrit 24.7 (L) 40.0 - 54.0 %    Platelets 142 (L) 150 - 350 K/uL   CBC auto differential    Collection Time: 08/20/17  6:21 AM   Result Value Ref Range    WBC 14.33 (H) 3.90 - 12.70 K/uL    Hemoglobin 8.2 (L) 14.0 - 18.0 g/dL    Hematocrit 25.1 (L) 40.0 - 54.0 %    Platelets 161 150 - 350 K/uL   CBC auto differential    Collection Time: 08/19/17 11:28 PM   Result Value Ref Range    WBC 13.56 (H) 3.90 - 12.70 K/uL    Hemoglobin 7.9 (L) 14.0 - 18.0 g/dL    Hematocrit 24.2 (L) 40.0 - 54.0 %    Platelets 135 (L) 150 - 350 K/uL     CMP  Sodium   Date Value Ref Range Status   08/20/2017 137 136 - 145 mmol/L Final     Potassium   Date Value Ref Range Status   08/20/2017 4.0 3.5 - 5.1 mmol/L Final     Chloride   Date Value Ref Range Status   08/20/2017 107 95 - 110 mmol/L Final     CO2   Date Value Ref Range Status   08/20/2017 23 23 - 29 mmol/L Final     Glucose   Date Value Ref Range Status   08/20/2017 118 (H) 70 - 110 mg/dL Final     BUN, Bld   Date Value Ref Range Status   08/20/2017 7 6 - 20 mg/dL Final     Creatinine   Date Value Ref Range Status   08/20/2017 0.8 0.5 - 1.4 mg/dL Final      Calcium   Date Value Ref Range Status   08/20/2017 6.9 (LL) 8.7 - 10.5 mg/dL Final     Comment:     Calcium  critical result(s) called and verbal readback obtained from   Judith Esposito., 08/20/2017 07:57       Total Protein   Date Value Ref Range Status   08/20/2017 6.2 6.0 - 8.4 g/dL Final     Albumin   Date Value Ref Range Status   08/20/2017 2.3 (L) 3.5 - 5.2 g/dL Final     Total Bilirubin   Date Value Ref Range Status   08/20/2017 1.0 0.1 - 1.0 mg/dL Final     Comment:     For infants and newborns, interpretation of results should be based  on gestational age, weight and in agreement with clinical  observations.  Premature Infant recommended reference ranges:  Up to 24 hours.............<8.0 mg/dL  Up to 48 hours............<12.0 mg/dL  3-5 days..................<15.0 mg/dL  6-29 days.................<15.0 mg/dL       Alkaline Phosphatase   Date Value Ref Range Status   08/20/2017 141 (H) 55 - 135 U/L Final     AST   Date Value Ref Range Status   08/20/2017 124 (H) 10 - 40 U/L Final     ALT   Date Value Ref Range Status   08/20/2017 24 10 - 44 U/L Final     Anion Gap   Date Value Ref Range Status   08/20/2017 7 (L) 8 - 16 mmol/L Final     eGFR if    Date Value Ref Range Status   08/20/2017 >60 >60 mL/min/1.73 m^2 Final     eGFR if non    Date Value Ref Range Status   08/20/2017 >60 >60 mL/min/1.73 m^2 Final     Comment:     Calculation used to obtain the estimated glomerular filtration  rate (eGFR) is the CKD-EPI equation. Since race is unknown   in our information system, the eGFR values for   -American and Non--American patients are given   for each creatinine result.         No results for input(s): INR, APTT in the last 24 hours.    Invalid input(s): PT    Ultrasound:    Hepatomegaly and concern for stenosis with occlusion of the superior mesenteric, main portal, and posterior right portal veins with reversal flow in the left and splenic veins. Dedicated  cross-sectional liver imaging with either multiphasic CT or MRI recommended for further evaluation when the patient's condition permits.    Nonocclusive IVC thrombus.    No discrete solid hepatic masses, although there are geographic hypoechoic regions near the gallbladder fossa and falciform ligament which may indicate fatty sparing.      Triple Phase CT Scan:    Hepatomegaly, nodular contour of the liver, heterogeneous appearing liver and ascites is concerning for liver disease.    Bilateral small pleural effusion and bibasilar subsegmental atelectasis.    A/P:  The patient is a 36 year old man with a history of alcohol abuse presenting with hematemesis.  1.  Hematemesis - he underwent an EGD on 8/18/17 and he had grade II esophageal varices s/p banding x 4.  The patient should not be transfused pRBCs above an H/H of 8/24.  The protonix drip can be completed and octreotide can be stopped later today.  He is completing a 7 day course of antibiotics.  His AFP is normal and a hepatitis panel is pending.  Alcohol cessation was emphasized.  A doppler ultrasound suggested a nonocclusive IVC thrombus as well as an occlusion/stenosis of the portal vein.  A triple phase CT scan did not suggest hepatocellular carcinoma.  As he has alcoholic cirrhosis, anticoagulation will not be started.  The patient will likely be discharged soon.  An EGD can be repeated in 1 month to reband the varices until they are eradicated.

## 2017-08-21 NOTE — SUBJECTIVE & OBJECTIVE
Interval History: No new issues. Would like to go home.     Review of Systems   Constitutional: Negative for activity change and diaphoresis.   Respiratory: Negative for chest tightness and shortness of breath.    Cardiovascular: Negative for chest pain.   Gastrointestinal: Negative for blood in stool.     Objective:     Vital Signs (Most Recent):  Temp: 98.4 °F (36.9 °C) (08/21/17 0800)  Pulse: 85 (08/21/17 0800)  Resp: 18 (08/21/17 0800)  BP: (!) 155/93 (08/21/17 0800)  SpO2: 97 % (08/21/17 0800) Vital Signs (24h Range):  Temp:  [98.4 °F (36.9 °C)-99.1 °F (37.3 °C)] 98.4 °F (36.9 °C)  Pulse:  [81-89] 85  Resp:  [18] 18  SpO2:  [96 %-98 %] 97 %  BP: (138-155)/(83-95) 155/93     Weight: 96.5 kg (212 lb 11.9 oz)  Body mass index is 34.34 kg/m².    Intake/Output Summary (Last 24 hours) at 08/21/17 0857  Last data filed at 08/21/17 0400   Gross per 24 hour   Intake              360 ml   Output                0 ml   Net              360 ml      Physical Exam   Constitutional: He is oriented to person, place, and time. He appears well-developed and well-nourished.   HENT:   Head: Normocephalic and atraumatic.   Neurological: He is alert and oriented to person, place, and time.   Psychiatric: He has a normal mood and affect. His behavior is normal.   Vitals reviewed.      Significant Labs:   BMP:   Recent Labs  Lab 08/21/17  0610      *   K 3.9      CO2 23   BUN 4*   CREATININE 0.7   CALCIUM 6.9*     CBC:   Recent Labs  Lab 08/20/17  0621 08/20/17  1610 08/21/17  0610   WBC 14.33* 13.00* 12.98*   HGB 8.2* 8.1* 8.2*   HCT 25.1* 24.7* 25.0*    142* 153       Significant Imaging:

## 2017-08-21 NOTE — ASSESSMENT & PLAN NOTE
Acute Upper GI bleeding- s/p EGD with  Esophageal  varices that were banded. S/p transfusion. H/H stable. GI following. On octreotide drip- through 8/21.  No further bleeding. H/H stable.

## 2017-08-21 NOTE — PLAN OF CARE
Problem: Fall Risk (Adult)  Goal: Absence of Falls  Patient will demonstrate the desired outcomes by discharge/transition of care.   Outcome: Ongoing (interventions implemented as appropriate)   08/21/17 0214   Fall Risk (Adult)   Absence of Falls making progress toward outcome   Pt did not fall or have any injuries during shift. Bed in lowest position, side rails up, and call light in reach    Problem: Gastrointestinal Bleeding (Adult)  Goal: Signs and Symptoms of Listed Potential Problems Will be Absent, Minimized or Managed (Gastrointestinal Bleeding)  Signs and symptoms of listed potential problems will be absent, minimized or managed by discharge/transition of care (reference Gastrointestinal Bleeding (Adult) CPG).   Outcome: Ongoing (interventions implemented as appropriate)   08/21/17 0214   Gastrointestinal Bleeding   Problems Assessed (GI Bleeding) all   Problems Present (GI Bleeding) none   Pt has no current bleeding at this time. H?H stable     Problem: Patient Care Overview  Goal: Plan of Care Review  Outcome: Ongoing (interventions implemented as appropriate)   08/21/17 0214   Coping/Psychosocial   Plan Of Care Reviewed With patient   Pt verbalized understanding plan of care. He was calm, cooperative, and accepting    Problem: Communication Impaired, Verbal (Adult,Obstetrics,Pediatric)  Goal: Improved/Effective Communication  Patient will demonstrate the desired outcomes by discharge/transition of care.   Outcome: Ongoing (interventions implemented as appropriate)   08/21/17 0214   Communication Impaired, Verbal (Adult,Obstetrics,Pediatric)   Improved/Effective Communication making progress toward outcome

## 2017-08-21 NOTE — PROGRESS NOTES
"[facility] Hospital Medicine  Progress Note    Patient Name: Alcides Mac  MRN: [id number]  Patient Class: IP- Inpatient   Admission Date: 8/18/2017  Length of Stay: 3 days  Attending Physician: Malcom Starr MD  Primary Care Provider: Primary Doctor No        Subjective:     Principal Problem:Acute upper GI bleeding    HPI:  Alcides Mac is a 36 y.o. male that (in part)  has no past medical history on file. Presents to [facility] Emergency Department complaining of dizziness, weakness, and fatigue.  The history is otherwise limited due to the condition of the patient due to somnolence after anesthesia from emergent EGD.  History was primarily taken from the emergency department physician who reports the patient presented with acute onset of nausea and hematemesis that began yesterday.  He had several episodes of hematemesis that continue "every 5 minutes".  This is been spontaneous and was not first overall episode.  He does report having at least 2-3 beers per day.  No known history of gastric varices or peptic ulcers.    In the emergency department routine laboratory studies were obtained.  He was also found to be tachycardic and hypotensive.  Subsequently had an episode of hematemesis in the ED.  An OG tube was placed and returned about 150cc dark red blood.  In addition, the patient had about 400cc dark red hematemesis after placement of the G-tube.  He also had an additional 150cc of dark red blood from stool that appeared to be melanotic.  Gastroenterology was emergently consulted and the patient was taken for EGD.  Operative report pending but preliminary thoughts are that the bleeding is due to gastric varices which were banded.    Hospital medicine has been asked to admit for further evaluation and treatment.     Hospital Course:  Patient was admitted for evaluation and treatment of upper GI bleed. GI was consulted and the patient underwent emergent " EGD. The patient was found to have gastric varices that were banded.  The patient was initially sent to the ICU but went to the floor on 8/18.  The patient was started on an octreotide drip and remained for 72 hours. The patient received blood.  GI continued to follow the patient. The patient was continued on a PPI and octreotide drip. His H/H remained stable.  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 I spoke to Dr. Garcia and he recommended no treatment. Further, he stated the patient can go home on 4 days of cipro, b-blocker and follow up with him in clinic in a week. The patient will be discharged to home today. Activity as tolerated. Diet-low NA. Follow up with PCP and GI in one week     Interval History: No new issues. Would like to go home.     Review of Systems   Constitutional: Negative for activity change and diaphoresis.   Respiratory: Negative for chest tightness and shortness of breath.    Cardiovascular: Negative for chest pain.   Gastrointestinal: Negative for blood in stool.     Objective:     Vital Signs (Most Recent):  Temp: 98.4 °F (36.9 °C) (08/21/17 0800)  Pulse: 85 (08/21/17 0800)  Resp: 18 (08/21/17 0800)  BP: (!) 155/93 (08/21/17 0800)  SpO2: 97 % (08/21/17 0800) Vital Signs (24h Range):  Temp:  [98.4 °F (36.9 °C)-99.1 °F (37.3 °C)] 98.4 °F (36.9 °C)  Pulse:  [81-89] 85  Resp:  [18] 18  SpO2:  [96 %-98 %] 97 %  BP: (138-155)/(83-95) 155/93     Weight: 96.5 kg (212 lb 11.9 oz)  Body mass index is 34.34 kg/m².    Intake/Output Summary (Last 24 hours) at 08/21/17 0857  Last data filed at 08/21/17 0400   Gross per 24 hour   Intake              360 ml   Output                0 ml   Net              360 ml      Physical Exam   Constitutional: He is oriented to person, place, and time. He appears well-developed and well-nourished.   HENT:   Head: Normocephalic and atraumatic.   Neurological: He is alert and oriented to person, place, and time.    Psychiatric: He has a normal mood and affect. His behavior is normal.   Vitals reviewed.      Significant Labs:   BMP:   Recent Labs  Lab 08/21/17  0610      *   K 3.9      CO2 23   BUN 4*   CREATININE 0.7   CALCIUM 6.9*     CBC:   Recent Labs  Lab 08/20/17  0621 08/20/17  1610 08/21/17  0610   WBC 14.33* 13.00* 12.98*   HGB 8.2* 8.1* 8.2*   HCT 25.1* 24.7* 25.0*    142* 153       Significant Imaging:    Assessment/Plan:      * Acute upper GI bleeding    Acute Upper GI bleeding- s/p EGD with  Esophageal  varices that were banded. S/p transfusion. H/H stable. GI following. On octreotide drip- through 8/21.  No further bleeding. H/H stable.           Portal vein thrombosis    As well as non-occlusive IVC thrombosis- spoke to GI. No treatment.            Alcohol abuse    Suspected chronic alcohol abuse based upon history, elevated AST, gastric varices, and hypoalbuminemia.  · Initiate banana bag  · Case management consult to provide community resources for substance abuse        Hyperglycemia    Unknown etiology.  Will obtain hemoglobin A1c.  Not a known diabetic.  May have chronic pancreatitis associated with chronic alcohol abuse.          Hypoalbuminemia    Likely malnourished from chronic alcohol use.          Elevated AST (SGOT)    Likely related to chronic alcohol abuse and cirrhosis.          Elevated INR    Minimally elevated but likely due to chronic liver disease/cirrhosis.  Likely contributory to his degree of GI bleeding.          Acute blood loss anemia    Due to acute upper GI bleeding as noted above.          Bleeding gastric varices    GI indicates this was likely bleeding gastric varices most likely secondary to portal hypertension secondary to cirrhosis of liver secondary to chronic alcohol abuse            VTE Risk Mitigation     None        Will d/c to home.         Malcom Chase MD  Department of Hospital Medicine   [facility]

## 2017-08-21 NOTE — PROGRESS NOTES
JEFFRY met with patient and spouse at bedside to discuss discharge plan. JEFFRY offered language line to patient to interpret Samoan, patient in agreement. SW utilized language line to communicate to patient  Vinicius, ID# [id number]. JEFFRY explained to patient he will need follow-up with PCP and GI. JEFFRY explained due to being uninsured she can refer him to [facility] and [facility] for GI follow-up. Patient in agreement with being referred to both clinics. JEFFRY contacted [facility] @ [phone] to schedule PCP follow-up, JEFFRY spoke to Ban, patient will see Dr. Enedina Lara on 9/5/17 @ 12:40pm. JEFFRY referred patient to [facility] by faxing clinicals to centralized referral fax @ [phone] enclosed: face sheet, H&P, MD notes, D/C summary, and imaging.

## 2017-08-21 NOTE — PLAN OF CARE
JEFFRY informed nurse Altagracia  completed all discharge planning with patient and she could complete her nursing discharge with patient. JEFFRY requested for nurse Frias to print AVS in Yoruba for patient to read.       08/21/17 1155   Final Note   Assessment Type Final Discharge Note   Discharge Disposition Home   Hospital Follow Up  Appt(s) scheduled? Yes   Offered [facility] -- Bedside Delivery? n/a   Discharge/Hospital Encounter Summary to ([facility]) PCP n/a   Referral to Outpatient Case Management complete? n/a   Referral to / orders for Home Health Complete? n/a   30 day supply of medicines given at discharge, if documented non-compliance / non-adherence? n/a   Any social issues identified prior to discharge? n/a   Did you assess the readiness or willingness of the family or caregiver to support self management of care? n/a   Right Care Referral Info   Post Acute Recommendation No Care

## 2017-08-21 NOTE — DISCHARGE SUMMARY
"[facility] Hospital Medicine  Discharge Summary      Patient Name: Alcides Mac  MRN: [id number]  Admission Date: 8/18/2017  Hospital Length of Stay: 3 days  Discharge Date and Time:  08/21/2017 9:23 AM  Attending Physician: Malcom Starr MD   Discharging Provider: Malcom Starr MD  Primary Care Provider: Primary Doctor No      HPI:   Alcides Mac is a 36 y.o. male that (in part)  has no past medical history on file. Presents to [facility] Emergency Department complaining of dizziness, weakness, and fatigue.  The history is otherwise limited due to the condition of the patient due to somnolence after anesthesia from emergent EGD.  History was primarily taken from the emergency department physician who reports the patient presented with acute onset of nausea and hematemesis that began yesterday.  He had several episodes of hematemesis that continue "every 5 minutes".  This is been spontaneous and was not first overall episode.  He does report having at least 2-3 beers per day.  No known history of gastric varices or peptic ulcers.    In the emergency department routine laboratory studies were obtained.  He was also found to be tachycardic and hypotensive.  Subsequently had an episode of hematemesis in the ED.  An OG tube was placed and returned about 150cc dark red blood.  In addition, the patient had about 400cc dark red hematemesis after placement of the G-tube.  He also had an additional 150cc of dark red blood from stool that appeared to be melanotic.  Gastroenterology was emergently consulted and the patient was taken for EGD.  Operative report pending but preliminary thoughts are that the bleeding is due to gastric varices which were banded.    Hospital medicine has been asked to admit for further evaluation and treatment.     Procedure(s) (LRB):  ESOPHAGOGASTRODUODENOSCOPY (EGD) (N/A)      Indwelling Lines/Drains at time of discharge: "   Lines/Drains/Airways          No matching active lines, drains, or airways        Hospital Course:   Patient was admitted for evaluation and treatment of upper GI bleed. GI was consulted and the patient underwent emergent EGD. The patient was found to have gastric varices that were banded.  The patient was initially sent to the ICU but went to the floor on 8/18.  The patient was started on an octreotide drip and remained for 72 hours. The patient received blood.  GI continued to follow the patient. The patient was continued on a PPI and octreotide drip. His H/H remained stable.  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 I spoke to Dr. Garcia and he recommended no treatment. Further, he stated the patient can go home on 4 days of cipro, b-blocker and follow up with him in clinic in a week. The patient will be discharged to home today. Activity as tolerated. Diet-low NA. Follow up with PCP and GI in one week.       Consults:   Consults         Status Ordering Provider     Inpatient consult to Gastroenterology  Once     Provider:  Micha Keenan MD    Acknowledged ADELINA MCCALL          Significant Diagnostic Studies:    Pending Diagnostic Studies:     Procedure Component Value Units Date/Time    Hepatitis B surface antigen [[id number]] Collected:  08/18/17 0937    Order Status:  Sent Lab Status:  In process Updated:  08/18/17 1625    Specimen:  Blood from Blood     Hepatitis C antibody [[id number]] Collected:  08/18/17 0937    Order Status:  Sent Lab Status:  In process Updated:  08/18/17 1625    Specimen:  Blood from Blood     Hepatitis panel, acute [[id number]] Collected:  08/19/17 1117    Order Status:  Sent Lab Status:  In process Updated:  08/20/17 1115    Specimen:  Blood from Blood         Final Active Diagnoses:    Diagnosis Date Noted POA    Portal vein thrombosis [I81] 08/21/2017 Yes    Bleeding gastric varices [I86.4] 08/18/2017 Yes    Elevated  INR [R79.1] 08/18/2017 Yes    Elevated AST (SGOT) [R74.0] 08/18/2017 Yes    Hypoalbuminemia [E88.09] 08/18/2017 Yes    Alcohol abuse [F10.10] 08/18/2017 Yes      Problems Resolved During this Admission:    Diagnosis Date Noted Date Resolved POA    PRINCIPAL PROBLEM:  Acute upper GI bleeding [K92.2] 08/18/2017 08/21/2017 Yes    Acute blood loss anemia [D62] 08/18/2017 08/21/2017 Yes    Hyperglycemia [R73.9] 08/18/2017 08/21/2017 Yes      No new Assessment & Plan notes have been filed under this hospital service since the last note was generated.  Service: Hospital Medicine      Discharged Condition: good    Disposition: Home or Self Care    Follow Up:  Follow-up Information     Primary Doctor No In 1 week.           Simone Garcia MD In 1 week.    Specialty:  Gastroenterology  Contact information:  [address] [place] [address]  [id number]  [facility]  [place] [id number] [phone]                 Patient Instructions:     Diet general   Order Specific Question Answer Comments   Na restriction, if any: 2gNa      Activity as tolerated       Medications:  Reconciled Home Medications:   Current Discharge Medication List      START taking these medications    Details   carvedilol (COREG) 3.125 MG tablet Take 1 tablet (3.125 mg total) by mouth 2 (two) times daily with meals.  Qty: 60 tablet, Refills: 5      ciprofloxacin HCl (CIPRO) 500 MG tablet Take 1 tablet (500 mg total) by mouth every 12 (twelve) hours.  Qty: 10 tablet, Refills: 0           Time spent on the discharge of patient:  < 30  minutes    HOS POC IP DISCHARGE SUMMARY    Malcom Chase MD  Department of Hospital Medicine  [facility]

## 2017-08-21 NOTE — PROGRESS NOTES
Report received from Judith HAMM. Nursing rounds completed. Assessed pt's general appearance/condition.  Patient resting quietly in bed, respirations even/unlabored, no signs of distress noted, pt denies pain at this time.  Will continue to monitor.

## 2017-08-21 NOTE — PLAN OF CARE
Problem: Fall Risk (Adult)  Intervention: Monitor/Assist with Self Care   08/20/17 1935 08/21/17 0800 08/21/17 0850   Activity   Activity Assistance Provided --  independent --    Daily Care Interventions   Self-Care Promotion --  --  independence encouraged   Functional Level Current   Ambulation 0 - independent --  --    Transferring 0 - independent --  --    Toileting 0 - independent --  --    Bathing 0 - independent --  --    Dressing 0 - independent --  --    Eating 0 - independent --  --    Communication 0 - understands/communicates without difficulty --  --    Swallowing 0 - swallows foods/liquids without difficulty --  --      Intervention: Reduce Risk/Promote Restraint Free Environment   08/18/17 0600   Safety Interventions   Safety Precautions emergency equipment at bedside     Intervention: Review Medications/Identify Contributors to Fall Risk   08/20/17 1449   Safety Interventions   Medication Review/Management medications reviewed;infusion initiated     Intervention: Patient Rounds   08/21/17 0800   Safety Interventions   Patient Rounds bed in low position;bed wheels locked;call light in reach;clutter free environment maintained;ID band on;placement of personal items at bedside;toileting offered;visualized patient     Intervention: Safety Promotion/Fall Prevention   08/21/17 0850   Safety Interventions   Safety Promotion/Fall Prevention bed alarm set;side rails raised x 2     Intervention: Safety Precautions   08/18/17 0600   Safety Interventions   Safety Precautions emergency equipment at bedside       Goal: Identify Related Risk Factors and Signs and Symptoms  Related risk factors and signs and symptoms are identified upon initiation of Human Response Clinical Practice Guideline (CPG)   Outcome: Ongoing (interventions implemented as appropriate)   08/19/17 1655   Fall Risk   Related Risk Factors (Fall Risk) age-related changes;environment unfamiliar   Signs and Symptoms (Fall Risk) presence of risk  factors     Goal: Absence of Falls  Patient will demonstrate the desired outcomes by discharge/transition of care.   Outcome: Ongoing (interventions implemented as appropriate)   08/21/17 1039   Fall Risk (Adult)   Absence of Falls making progress toward outcome       Problem: Gastrointestinal Bleeding (Adult)  Intervention: Monitor/Manage Gastrointestinal Bleed Characteristics/Effects   08/20/17 1449 08/20/17 1935   Monitor/Manage Gastrointestinal Bleed Characteristics/Effects   GI Signs/Symptoms --  no gastrointestinal signs/symptoms   Safety Interventions   Medication Review/Management medications reviewed;infusion initiated --      Intervention: Support/Optimize Psychosocial Response to Illness   08/20/17 1449   Coping/Psychosocial Interventions   Supportive Measures active listening utilized;counseling provided;decision-making supported;self-reflection promoted;relaxation techniques promoted;positive reinforcement provided;self-care encouraged;self-responsibility promoted;verbalization of feelings encouraged   Psychosocial Support   Family/Support System Care presence promoted     Intervention: Elevate Head of Bed 30-45 Degrees   08/20/17 1449 08/21/17 0800   Positioning   Head of Bed (HOB) --  HOB at 30 degrees   Safety Interventions   Aspiration Precautions upright posture maintained --      Intervention: Monitor/Manage Fluid Electrolyte Balance   08/20/17 1449   Nutrition Interventions   Fluid/Electrolyte Management fluids provided       Goal: Signs and Symptoms of Listed Potential Problems Will be Absent, Minimized or Managed (Gastrointestinal Bleeding)  Signs and symptoms of listed potential problems will be absent, minimized or managed by discharge/transition of care (reference Gastrointestinal Bleeding (Adult) CPG).   Outcome: Ongoing (interventions implemented as appropriate)   08/21/17 0214   Gastrointestinal Bleeding   Problems Assessed (GI Bleeding) all   Problems Present (GI Bleeding) none

## 2017-08-21 NOTE — PROGRESS NOTES
Discharge instructions given to patient in Romansh via LEIDY intrepreter at bedside. Patient verbalized understanding of instructions. Patient states willingness to comply. Saline lock removed. Tele monitoring removed.

## 2017-09-06 ENCOUNTER — HOSPITAL ENCOUNTER (INPATIENT)
Facility: HOSPITAL | Age: 37
LOS: 2 days | Discharge: HOME | DRG: 299 | End: 2017-09-08
Attending: EMERGENCY MEDICINE | Admitting: HOSPITALIST
Payer: MEDICAID

## 2017-09-06 DIAGNOSIS — K92.2 GASTROINTESTINAL HEMORRHAGE, UNSPECIFIED GASTROINTESTINAL HEMORRHAGE TYPE: Primary | ICD-10-CM

## 2017-09-06 DIAGNOSIS — R58 BLEEDING: ICD-10-CM

## 2017-09-06 DIAGNOSIS — I85.00 ESOPHAGEAL VARICES WITHOUT BLEEDING, UNSPECIFIED ESOPHAGEAL VARICES TYPE: ICD-10-CM

## 2017-09-06 LAB
ABO + RH BLD: NORMAL
ALBUMIN SERPL BCP-MCNC: 2.1 G/DL
ALP SERPL-CCNC: 128 U/L
ALT SERPL W/O P-5'-P-CCNC: 17 U/L
ANION GAP SERPL CALC-SCNC: 7 MMOL/L
ANISOCYTOSIS BLD QL SMEAR: SLIGHT
APTT BLDCRRT: 27.5 SEC
AST SERPL-CCNC: 52 U/L
BASOPHILS NFR BLD: 0 %
BILIRUB SERPL-MCNC: 0.6 MG/DL
BLD GP AB SCN CELLS X3 SERPL QL: NORMAL
BUN SERPL-MCNC: 7 MG/DL
CALCIUM SERPL-MCNC: 7.8 MG/DL
CHLORIDE SERPL-SCNC: 108 MMOL/L
CO2 SERPL-SCNC: 22 MMOL/L
CREAT SERPL-MCNC: 0.8 MG/DL
DIFFERENTIAL METHOD: ABNORMAL
EOSINOPHIL NFR BLD: 0 %
ERYTHROCYTE [DISTWIDTH] IN BLOOD BY AUTOMATED COUNT: 19 %
EST. GFR  (AFRICAN AMERICAN): >60 ML/MIN/1.73 M^2
EST. GFR  (NON AFRICAN AMERICAN): >60 ML/MIN/1.73 M^2
GLUCOSE SERPL-MCNC: 131 MG/DL
HCT VFR BLD AUTO: 15.8 %
HGB BLD-MCNC: 4.5 G/DL
HYPOCHROMIA BLD QL SMEAR: ABNORMAL
INR PPP: 1.2
LYMPHOCYTES NFR BLD: 32 %
MCH RBC QN AUTO: 28.8 PG
MCHC RBC AUTO-ENTMCNC: 28.5 G/DL
MCV RBC AUTO: 101 FL
MONOCYTES NFR BLD: 1 %
NEUTROPHILS NFR BLD: 67 %
PLATELET # BLD AUTO: 424 K/UL
PMV BLD AUTO: 9.7 FL
POCT GLUCOSE: 143 MG/DL (ref 70–110)
POTASSIUM SERPL-SCNC: 3.9 MMOL/L
PROT SERPL-MCNC: 6.4 G/DL
PROTHROMBIN TIME: 12.7 SEC
RBC # BLD AUTO: 1.56 M/UL
SODIUM SERPL-SCNC: 137 MMOL/L
WBC # BLD AUTO: 15.6 K/UL

## 2017-09-06 PROCEDURE — 36415 COLL VENOUS BLD VENIPUNCTURE: CPT

## 2017-09-06 PROCEDURE — 86901 BLOOD TYPING SEROLOGIC RH(D): CPT

## 2017-09-06 PROCEDURE — 25000003 PHARM REV CODE 250: Performed by: INTERNAL MEDICINE

## 2017-09-06 PROCEDURE — 85027 COMPLETE CBC AUTOMATED: CPT

## 2017-09-06 PROCEDURE — 96375 TX/PRO/DX INJ NEW DRUG ADDON: CPT

## 2017-09-06 PROCEDURE — 25000003 PHARM REV CODE 250

## 2017-09-06 PROCEDURE — 63600175 PHARM REV CODE 636 W HCPCS

## 2017-09-06 PROCEDURE — P9021 RED BLOOD CELLS UNIT: HCPCS

## 2017-09-06 PROCEDURE — 86900 BLOOD TYPING SEROLOGIC ABO: CPT

## 2017-09-06 PROCEDURE — 82746 ASSAY OF FOLIC ACID SERUM: CPT

## 2017-09-06 PROCEDURE — 85007 BL SMEAR W/DIFF WBC COUNT: CPT

## 2017-09-06 PROCEDURE — A4216 STERILE WATER/SALINE, 10 ML: HCPCS

## 2017-09-06 PROCEDURE — 11000001 HC ACUTE MED/SURG PRIVATE ROOM

## 2017-09-06 PROCEDURE — 87040 BLOOD CULTURE FOR BACTERIA: CPT | Mod: 59

## 2017-09-06 PROCEDURE — 82607 VITAMIN B-12: CPT

## 2017-09-06 PROCEDURE — 86920 COMPATIBILITY TEST SPIN: CPT

## 2017-09-06 PROCEDURE — 99284 EMERGENCY DEPT VISIT MOD MDM: CPT | Mod: 25

## 2017-09-06 PROCEDURE — 36430 TRANSFUSION BLD/BLD COMPNT: CPT

## 2017-09-06 PROCEDURE — 85730 THROMBOPLASTIN TIME PARTIAL: CPT

## 2017-09-06 PROCEDURE — 63600175 PHARM REV CODE 636 W HCPCS: Performed by: INTERNAL MEDICINE

## 2017-09-06 PROCEDURE — 80053 COMPREHEN METABOLIC PANEL: CPT

## 2017-09-06 PROCEDURE — 85610 PROTHROMBIN TIME: CPT

## 2017-09-06 PROCEDURE — 82962 GLUCOSE BLOOD TEST: CPT

## 2017-09-06 PROCEDURE — 96374 THER/PROPH/DIAG INJ IV PUSH: CPT

## 2017-09-06 PROCEDURE — C9113 INJ PANTOPRAZOLE SODIUM, VIA: HCPCS | Performed by: INTERNAL MEDICINE

## 2017-09-06 PROCEDURE — C9113 INJ PANTOPRAZOLE SODIUM, VIA: HCPCS

## 2017-09-06 RX ORDER — PANTOPRAZOLE SODIUM 40 MG/10ML
40 INJECTION, POWDER, LYOPHILIZED, FOR SOLUTION INTRAVENOUS 2 TIMES DAILY
Status: DISCONTINUED | OUTPATIENT
Start: 2017-09-06 | End: 2017-09-06

## 2017-09-06 RX ORDER — HYDROCODONE BITARTRATE AND ACETAMINOPHEN 500; 5 MG/1; MG/1
TABLET ORAL
Status: DISCONTINUED | OUTPATIENT
Start: 2017-09-06 | End: 2017-09-08 | Stop reason: HOSPADM

## 2017-09-06 RX ORDER — ACETAMINOPHEN 325 MG/1
650 TABLET ORAL EVERY 6 HOURS PRN
Status: DISCONTINUED | OUTPATIENT
Start: 2017-09-06 | End: 2017-09-08 | Stop reason: HOSPADM

## 2017-09-06 RX ORDER — HYDROCODONE BITARTRATE AND ACETAMINOPHEN 5; 325 MG/1; MG/1
1 TABLET ORAL EVERY 4 HOURS PRN
Status: DISCONTINUED | OUTPATIENT
Start: 2017-09-06 | End: 2017-09-08 | Stop reason: HOSPADM

## 2017-09-06 RX ORDER — SODIUM CHLORIDE 0.9 % (FLUSH) 0.9 %
3 SYRINGE (ML) INJECTION EVERY 8 HOURS
Status: DISCONTINUED | OUTPATIENT
Start: 2017-09-06 | End: 2017-09-08 | Stop reason: HOSPADM

## 2017-09-06 RX ORDER — PANTOPRAZOLE SODIUM 40 MG/10ML
40 INJECTION, POWDER, LYOPHILIZED, FOR SOLUTION INTRAVENOUS
Status: COMPLETED | OUTPATIENT
Start: 2017-09-06 | End: 2017-09-06

## 2017-09-06 RX ORDER — OCTREOTIDE ACETATE 100 UG/ML
50 INJECTION, SOLUTION INTRAVENOUS; SUBCUTANEOUS
Status: COMPLETED | OUTPATIENT
Start: 2017-09-06 | End: 2017-09-06

## 2017-09-06 RX ADMIN — OCTREOTIDE ACETATE 50 MCG: 100 INJECTION, SOLUTION INTRAVENOUS; SUBCUTANEOUS at 03:09

## 2017-09-06 RX ADMIN — OCTREOTIDE ACETATE 50 MCG/HR: 500 INJECTION, SOLUTION INTRAVENOUS; SUBCUTANEOUS at 07:09

## 2017-09-06 RX ADMIN — ACETAMINOPHEN 650 MG: 325 TABLET, FILM COATED ORAL at 11:09

## 2017-09-06 RX ADMIN — PANTOPRAZOLE SODIUM 40 MG: 40 INJECTION, POWDER, FOR SOLUTION INTRAVENOUS at 03:09

## 2017-09-06 RX ADMIN — CEFTRIAXONE 1 G: 1 INJECTION, SOLUTION INTRAVENOUS at 06:09

## 2017-09-06 RX ADMIN — DEXTROSE 8 MG/HR: 50 INJECTION, SOLUTION INTRAVENOUS at 11:09

## 2017-09-06 RX ADMIN — Medication 3 ML: at 10:09

## 2017-09-06 NOTE — ED TRIAGE NOTES
37 yo male comes in with the cc of of abnormal lab. Pt seen his  yesterday, had blood drawn, and received a call to tell him his H & H was 4.  Pt has hx of esopgageal varisee's , pt is no distress

## 2017-09-06 NOTE — CONSULTS
"Chief Complaint:  "I have low blood levels."    HPI:  The patient is a 36 year old man with a history of alcohol cirrhosis presenting with anemia.  He is well known to the GI Service as he presented a few weeks ago with hematemesis and rectal bleeding.   He underwent an EGD on 8/18/17 and he had grade II esophageal varices s/p banding x 4.  A hepatitis panel at that time was negative and it was suspected that he has alcoholic cirrhosis.  A doppler ultrasound also showed concern for stenosis with occlusion of the superior mesenteric, main portal, and posterior right portal veins.  A CT scan was obtained to better visualize this, but did not provide any further information.  He felt weak and had coughing, so he went to the [facility], where he was found to be anemic.  At [facility], his H/H was 4.5/15.8.  He denies having any hematemesis or melena, but he did have suspected hemorrhoidal bleeding a few days ago, which resolved.  He does not have abdominal pain, weight loss, nausea, emesis, diarrhea, or constipation.  The patient does not take NSAIDs.  It was planned to repeat the EGD in 1 month to retreat the varices.    No past medical history on file.    No past surgical history on file.    No family history on file.    Social History     Social History    Marital status:      Spouse name: N/A    Number of children: N/A    Years of education: N/A     Occupational History    Not on file.     Social History Main Topics    Smoking status: Never Smoker    Smokeless tobacco: Never Used    Alcohol use 1.2 - 1.8 oz/week     2 - 3 Cans of beer per week      Comment: per day    Drug use: No    Sexual activity: Not on file     Other Topics Concern    Not on file     Social History Narrative    No narrative on file      octreotide  50 mcg Intravenous ED 1 Time    pantoprazole  40 mg Intravenous ED 1 Time     Review of patient's allergies indicates:  No Known Allergies    ROS:  No chest pain or " dyspnea.  No dysuria.  No heartburn or dysphagia.  Otherwise as stated above.  Ten other systems negative.    Vitals:    09/06/17 1300 09/06/17 1451 09/06/17 1453 09/06/17 1455   BP: 116/74 112/69 (!) 107/58 112/61   BP Location: Left arm Left arm Left arm Left arm   Patient Position: Lying      Pulse: 93 92 96 97   Resp:       Temp: (!) 39.2 °F (4 °C)      SpO2: 100%      Weight:       Height:         P.E.:  GEN: A x O x 3, NAD  SKIN: No jaundice  HEENT: EOMI, PERRL, anicteric sclera  CV: RRR, no M/R/G  Chest: CTA B  Abdomen: soft, NTND, normoactive BS  Ext: No C/C/E.  2+ dorsalis pedis pulses B  Neuro: No asterixes or tremors.  CN II-XII intact  Musculoskeletal: 5/5 strength bilaterally    Labs:  Recent Results (from the past 336 hour(s))   CBC auto differential    Collection Time: 09/06/17  1:33 PM   Result Value Ref Range    WBC 15.60 (H) 3.90 - 12.70 K/uL    Hemoglobin 4.5 (LL) 14.0 - 18.0 g/dL    Hematocrit 15.8 (LL) 40.0 - 54.0 %    Platelets 424 (H) 150 - 350 K/uL     CMP  Sodium   Date Value Ref Range Status   09/06/2017 137 136 - 145 mmol/L Final     Potassium   Date Value Ref Range Status   09/06/2017 3.9 3.5 - 5.1 mmol/L Final     Chloride   Date Value Ref Range Status   09/06/2017 108 95 - 110 mmol/L Final     CO2   Date Value Ref Range Status   09/06/2017 22 (L) 23 - 29 mmol/L Final     Glucose   Date Value Ref Range Status   09/06/2017 131 (H) 70 - 110 mg/dL Final     BUN, Bld   Date Value Ref Range Status   09/06/2017 7 6 - 20 mg/dL Final     Creatinine   Date Value Ref Range Status   09/06/2017 0.8 0.5 - 1.4 mg/dL Final     Calcium   Date Value Ref Range Status   09/06/2017 7.8 (L) 8.7 - 10.5 mg/dL Final     Total Protein   Date Value Ref Range Status   09/06/2017 6.4 6.0 - 8.4 g/dL Final     Albumin   Date Value Ref Range Status   09/06/2017 2.1 (L) 3.5 - 5.2 g/dL Final     Total Bilirubin   Date Value Ref Range Status   09/06/2017 0.6 0.1 - 1.0 mg/dL Final     Comment:     For infants and  newborns, interpretation of results should be based  on gestational age, weight and in agreement with clinical  observations.  Premature Infant recommended reference ranges:  Up to 24 hours.............<8.0 mg/dL  Up to 48 hours............<12.0 mg/dL  3-5 days..................<15.0 mg/dL  6-29 days.................<15.0 mg/dL       Alkaline Phosphatase   Date Value Ref Range Status   09/06/2017 128 55 - 135 U/L Final     AST   Date Value Ref Range Status   09/06/2017 52 (H) 10 - 40 U/L Final     ALT   Date Value Ref Range Status   09/06/2017 17 10 - 44 U/L Final     Anion Gap   Date Value Ref Range Status   09/06/2017 7 (L) 8 - 16 mmol/L Final     eGFR if    Date Value Ref Range Status   09/06/2017 >60 >60 mL/min/1.73 m^2 Final     eGFR if non    Date Value Ref Range Status   09/06/2017 >60 >60 mL/min/1.73 m^2 Final     Comment:     Calculation used to obtain the estimated glomerular filtration  rate (eGFR) is the CKD-EPI equation. Since race is unknown   in our information system, the eGFR values for   -American and Non--American patients are given   for each creatinine result.           Recent Labs  Lab 09/06/17  1333   INR 1.2   APTT 27.5     A/P:  The patient is a 36 year old man with a history of alcohol cirrhosis presenting with anemia.  1.  Anemia - the patient presented with a profound anemia.  He has not noticed any gross bleeding, except supposed hemorrhoidal bleeding a few days ago.  As he recently had suspected bleeding varices that required banding, he can be transfused pRBCs today and undergo a repeat EGD tomorrow.  He should not be transfused over an H/H of 8/24, as this can cause the varices to burst.  A protonix drip, octreotide drip, and ceftriaxone will be started.  Once his anemia improves, a paracentesis can be performed to evaluate for spontaneous bacterial peritonitis.  Alcohol cessation was encouraged.    Thank you for this consult.

## 2017-09-06 NOTE — PROVIDER PROGRESS NOTES - EMERGENCY DEPT.
Encounter Date: 9/6/2017    ED Physician Progress Notes        Physician Note:   Blood consent obtained from patient with     Gabe Garcia, don't transfuse over hemoglobin of 8 as it can force the esophageal varices to bleed further.  He will follow closely.    Dr. Avilez begin octreotide drip and admit floor

## 2017-09-06 NOTE — ED PROVIDER NOTES
"Encounter Date: 9/6/2017    SCRIBE #1 NOTE: I, Lillymaikel Arturo, am scribing for, and in the presence of,  Manoj Benavidez MD. I have scribed the following portions of the note - Other sections scribed: HPI/ROS.       History     Chief Complaint   Patient presents with    Abnormal Lab     Was sent here from [facility] for low H and H.  Hx of esophageal varices.       CC: Abnormal Lab    HPI: This 36 y.o. 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 Patient was seen last month (august) for esophageal varices where he had an esophagoscopy completed and informed to follow up with [facility]. He notes having 1 blood transfusion at this facility. Patient also reports "white, yellow-charlene, and sticky" stool, cough, fatigue, and SOB recently. SOB occurs when patient ambulates. He notes having rectal bleeding due to hemorrhoids 3-4 days ago which resided on its own. No alleviating factors. Patient denies fever, chills, chest pain, dysuria, numbness, and weakness.     Side note: He notes a moderator band in place now reporting 2 have ruptured in the past.       The history is provided by the patient and the spouse. A  was used.     Review of patient's allergies indicates:  No Known Allergies  No past medical history on file.  No past surgical history on file.  No family history on file.  Social History   Substance Use Topics    Smoking status: Never Smoker    Smokeless tobacco: Never Used    Alcohol use 1.2 - 1.8 oz/week     2 - 3 Cans of beer per week      Comment: per day     Review of Systems   Constitutional: Positive for fatigue. Negative for chills and fever.   HENT: Negative for congestion, ear pain, rhinorrhea and sore throat.    Eyes: Negative for pain and visual disturbance.   Respiratory: Positive for cough and shortness of breath.  "   Cardiovascular: Negative for chest pain.   Gastrointestinal: Positive for anal bleeding. Negative for abdominal pain, diarrhea, nausea and vomiting.        (+) Abnormal Stool   Genitourinary: Negative for dysuria.   Musculoskeletal: Negative for back pain and neck pain.   Skin: Negative for rash.   Neurological: Negative for headaches.       Physical Exam     Initial Vitals [09/06/17 1255]   BP Pulse Resp Temp SpO2   (!) 109/59 96 18 98.5 °F (36.9 °C) 100 %      MAP       75.67         Physical Exam well-developed well-nourished  male alert  HEENT exam fell sclera pale lid margins pill mucous membranes nonicteric sclera nares benign  Neck no adenopathy no JVD  Lungs clear no rales rhonchi or wheezing  Heart a vascular regular rate and rhythm no murmur rub or gallop  Abdomen very protuberant bowel sounds positive soft nontender unable to appreciate masses or hepatosplenomegaly negative De's negative McBurney's no guarding  Rectal normal tone scant brown material tracing positive  Skin without rash [place] muscular skeletal without deformity    ED Course   Critical Care  Date/Time: 9/6/2017 3:12 PM  Performed by: STEFAN SANCHEZ  Authorized by: STEFAN SANCHEZ   Direct patient critical care time: 14 minutes  Additional history critical care time: 7 minutes  Ordering / reviewing critical care time: 5 minutes  Documentation critical care time: 21 minutes  Consulting other physicians critical care time: 9 minutes  Total critical care time (exclusive of procedural time) : 56 minutes  Critical care was necessary to treat or prevent imminent or life-threatening deterioration of the following conditions: circulatory failure (GI blood loss).        Labs Reviewed   CBC W/ AUTO DIFFERENTIAL - Abnormal; Notable for the following:        Result Value    WBC 15.60 (*)     RBC 1.56 (*)     Hemoglobin 4.5 (*)     Hematocrit 15.8 (*)      (*)     MCHC 28.5 (*)     RDW 19.0 (*)     Platelets 424 (*)     Mono%  "1.0 (*)     All other components within normal limits    Narrative:     HGB AND HCT   critical result(s) called and verbal readback obtained   from STEPHEN LORENZO , 09/06/2017 14:14   COMPREHENSIVE METABOLIC PANEL - Abnormal; Notable for the following:     CO2 22 (*)     Glucose 131 (*)     Calcium 7.8 (*)     Albumin 2.1 (*)     AST 52 (*)     Anion Gap 7 (*)     All other components within normal limits   PROTIME-INR - Abnormal; Notable for the following:     Prothrombin Time 12.7 (*)     All other components within normal limits   APTT   FOLATE   VITAMIN B12   TYPE & SCREEN   PREPARE RBC SOFT          X-Rays:   Independently Interpreted Readings:   Other Readings:  Flat and erect abdomen without free air    patient with history of esophageal varices and status post banding earlier in August.  Went to the [facility] and found to have a hemoglobin of 4 yesterday and sent him.  He has had "some bleeding from his hemorrhoids still there is no hemorrhoids on exam and brown material.  Differential includes ulcer esophageal variceal bleeding I doubt diverticulitis.             Scribe Attestation:   Scribe #1: I performed the above scribed service and the documentation accurately describes the services I performed. I attest to the accuracy of the note.    Attending Attestation:           Physician Attestation for Scribe:  Physician Attestation Statement for Scribe #1: I, Manoj Benavidez MD, reviewed documentation, as scribed by Dino Morris in my presence, and it is both accurate and complete.                 ED Course      Clinical Impression:   The primary encounter diagnosis was Gastrointestinal hemorrhage, unspecified gastrointestinal hemorrhage type. Diagnoses of Bleeding and Esophageal varices without bleeding, unspecified esophageal varices type were also pertinent to this visit.                           Manoj Benavidez MD  09/06/17 1444       Manoj Benavidez MD  09/06/17 1513       Manoj Benavidez, " MD  09/06/17 1512

## 2017-09-07 ENCOUNTER — SURGERY (OUTPATIENT)
Age: 37
End: 2017-09-07

## 2017-09-07 ENCOUNTER — ANESTHESIA (OUTPATIENT)
Dept: ENDOSCOPY | Facility: HOSPITAL | Age: 37
DRG: 299 | End: 2017-09-07
Payer: MEDICAID

## 2017-09-07 ENCOUNTER — ANESTHESIA EVENT (OUTPATIENT)
Dept: ENDOSCOPY | Facility: HOSPITAL | Age: 37
DRG: 299 | End: 2017-09-07
Payer: MEDICAID

## 2017-09-07 PROBLEM — K92.2 GASTROINTESTINAL HEMORRHAGE: Status: ACTIVE | Noted: 2017-09-07

## 2017-09-07 PROBLEM — D62 ACUTE BLOOD LOSS ANEMIA: Status: ACTIVE | Noted: 2017-09-07

## 2017-09-07 LAB
ANISOCYTOSIS BLD QL SMEAR: ABNORMAL
BASO STIPL BLD QL SMEAR: ABNORMAL
BASOPHILS # BLD AUTO: 0.06 K/UL
BASOPHILS # BLD AUTO: 0.06 K/UL
BASOPHILS # BLD AUTO: 0.09 K/UL
BASOPHILS NFR BLD: 0.3 %
BASOPHILS NFR BLD: 0.3 %
BASOPHILS NFR BLD: 0.4 %
BLD PROD TYP BPU: NORMAL
BLOOD UNIT EXPIRATION DATE: NORMAL
BLOOD UNIT TYPE CODE: 5100
BLOOD UNIT TYPE: NORMAL
CODING SYSTEM: NORMAL
DACRYOCYTES BLD QL SMEAR: ABNORMAL
DACRYOCYTES BLD QL SMEAR: ABNORMAL
DIFFERENTIAL METHOD: ABNORMAL
DISPENSE STATUS: NORMAL
EOSINOPHIL # BLD AUTO: 0.4 K/UL
EOSINOPHIL # BLD AUTO: 0.6 K/UL
EOSINOPHIL # BLD AUTO: 0.6 K/UL
EOSINOPHIL NFR BLD: 2 %
EOSINOPHIL NFR BLD: 2.6 %
EOSINOPHIL NFR BLD: 2.9 %
EOSINOPHIL URNS QL WRIGHT STN: NORMAL
ERYTHROCYTE [DISTWIDTH] IN BLOOD BY AUTOMATED COUNT: 19.1 %
ERYTHROCYTE [DISTWIDTH] IN BLOOD BY AUTOMATED COUNT: 19.1 %
ERYTHROCYTE [DISTWIDTH] IN BLOOD BY AUTOMATED COUNT: 20 %
FOLATE SERPL-MCNC: 25.5 NG/ML
GIANT PLATELETS BLD QL SMEAR: PRESENT
HCT VFR BLD AUTO: 20.3 %
HCT VFR BLD AUTO: 27 %
HCT VFR BLD AUTO: 29.2 %
HGB BLD-MCNC: 6.1 G/DL
HGB BLD-MCNC: 8.6 G/DL
HGB BLD-MCNC: 9.3 G/DL
HYPOCHROMIA BLD QL SMEAR: ABNORMAL
HYPOCHROMIA BLD QL SMEAR: ABNORMAL
INR PPP: 1.3
LYMPHOCYTES # BLD AUTO: 2.2 K/UL
LYMPHOCYTES # BLD AUTO: 2.4 K/UL
LYMPHOCYTES # BLD AUTO: 2.4 K/UL
LYMPHOCYTES NFR BLD: 10.2 %
LYMPHOCYTES NFR BLD: 11.7 %
LYMPHOCYTES NFR BLD: 12.6 %
MCH RBC QN AUTO: 27.4 PG
MCH RBC QN AUTO: 27.9 PG
MCH RBC QN AUTO: 28.1 PG
MCHC RBC AUTO-ENTMCNC: 30 G/DL
MCHC RBC AUTO-ENTMCNC: 31.8 G/DL
MCHC RBC AUTO-ENTMCNC: 31.9 G/DL
MCV RBC AUTO: 88 FL
MCV RBC AUTO: 88 FL
MCV RBC AUTO: 91 FL
MONOCYTES # BLD AUTO: 1.5 K/UL
MONOCYTES # BLD AUTO: 1.7 K/UL
MONOCYTES # BLD AUTO: 1.8 K/UL
MONOCYTES NFR BLD: 8.1 %
MONOCYTES NFR BLD: 8.2 %
MONOCYTES NFR BLD: 8.5 %
NEUTROPHILS # BLD AUTO: 14.5 K/UL
NEUTROPHILS # BLD AUTO: 16 K/UL
NEUTROPHILS # BLD AUTO: 16.8 K/UL
NEUTROPHILS NFR BLD: 76.9 %
NEUTROPHILS NFR BLD: 78.1 %
NEUTROPHILS NFR BLD: 79.6 %
OVALOCYTES BLD QL SMEAR: ABNORMAL
OVALOCYTES BLD QL SMEAR: ABNORMAL
PLATELET # BLD AUTO: 323 K/UL
PLATELET # BLD AUTO: 337 K/UL
PLATELET # BLD AUTO: 337 K/UL
PLATELET BLD QL SMEAR: ABNORMAL
PMV BLD AUTO: 8.7 FL
PMV BLD AUTO: 8.8 FL
PMV BLD AUTO: 8.9 FL
POIKILOCYTOSIS BLD QL SMEAR: SLIGHT
POIKILOCYTOSIS BLD QL SMEAR: SLIGHT
POLYCHROMASIA BLD QL SMEAR: ABNORMAL
PROTHROMBIN TIME: 13.4 SEC
RBC # BLD AUTO: 2.23 M/UL
RBC # BLD AUTO: 3.06 M/UL
RBC # BLD AUTO: 3.33 M/UL
TRANS ERYTHROCYTES VOL PATIENT: NORMAL ML
VIT B12 SERPL-MCNC: 906 PG/ML
WBC # BLD AUTO: 18.99 K/UL
WBC # BLD AUTO: 20.77 K/UL
WBC # BLD AUTO: 21.32 K/UL

## 2017-09-07 PROCEDURE — 25000003 PHARM REV CODE 250: Performed by: INTERNAL MEDICINE

## 2017-09-07 PROCEDURE — 36430 TRANSFUSION BLD/BLD COMPNT: CPT

## 2017-09-07 PROCEDURE — 63600175 PHARM REV CODE 636 W HCPCS: Performed by: NURSE ANESTHETIST, CERTIFIED REGISTERED

## 2017-09-07 PROCEDURE — 63600175 PHARM REV CODE 636 W HCPCS: Performed by: INTERNAL MEDICINE

## 2017-09-07 PROCEDURE — P9021 RED BLOOD CELLS UNIT: HCPCS

## 2017-09-07 PROCEDURE — 11000001 HC ACUTE MED/SURG PRIVATE ROOM

## 2017-09-07 PROCEDURE — A4216 STERILE WATER/SALINE, 10 ML: HCPCS

## 2017-09-07 PROCEDURE — D9220A PRA ANESTHESIA: Mod: CRNA,,, | Performed by: NURSE ANESTHETIST, CERTIFIED REGISTERED

## 2017-09-07 PROCEDURE — 94761 N-INVAS EAR/PLS OXIMETRY MLT: CPT

## 2017-09-07 PROCEDURE — 85610 PROTHROMBIN TIME: CPT

## 2017-09-07 PROCEDURE — D9220A PRA ANESTHESIA: Mod: ANES,,, | Performed by: ANESTHESIOLOGY

## 2017-09-07 PROCEDURE — 37000008 HC ANESTHESIA 1ST 15 MINUTES: Performed by: INTERNAL MEDICINE

## 2017-09-07 PROCEDURE — 0DJ08ZZ INSPECTION OF UPPER INTESTINAL TRACT, VIA NATURAL OR ARTIFICIAL OPENING ENDOSCOPIC: ICD-10-PCS | Performed by: INTERNAL MEDICINE

## 2017-09-07 PROCEDURE — C9113 INJ PANTOPRAZOLE SODIUM, VIA: HCPCS | Performed by: INTERNAL MEDICINE

## 2017-09-07 PROCEDURE — 87086 URINE CULTURE/COLONY COUNT: CPT

## 2017-09-07 PROCEDURE — 63600175 PHARM REV CODE 636 W HCPCS

## 2017-09-07 PROCEDURE — 87205 SMEAR GRAM STAIN: CPT

## 2017-09-07 PROCEDURE — 85025 COMPLETE CBC W/AUTO DIFF WBC: CPT

## 2017-09-07 PROCEDURE — 43235 EGD DIAGNOSTIC BRUSH WASH: CPT | Performed by: INTERNAL MEDICINE

## 2017-09-07 PROCEDURE — 25000003 PHARM REV CODE 250

## 2017-09-07 PROCEDURE — 36415 COLL VENOUS BLD VENIPUNCTURE: CPT

## 2017-09-07 RX ORDER — LIDOCAINE HYDROCHLORIDE 20 MG/ML
INJECTION, SOLUTION EPIDURAL; INFILTRATION; INTRACAUDAL; PERINEURAL
Status: DISCONTINUED
Start: 2017-09-07 | End: 2017-09-07 | Stop reason: WASHOUT

## 2017-09-07 RX ORDER — PROPOFOL 10 MG/ML
VIAL (ML) INTRAVENOUS
Status: DISCONTINUED | OUTPATIENT
Start: 2017-09-07 | End: 2017-09-07

## 2017-09-07 RX ORDER — PROPOFOL 10 MG/ML
VIAL (ML) INTRAVENOUS
Status: DISCONTINUED
Start: 2017-09-07 | End: 2017-09-07 | Stop reason: WASHOUT

## 2017-09-07 RX ORDER — HYDROCODONE BITARTRATE AND ACETAMINOPHEN 500; 5 MG/1; MG/1
TABLET ORAL
Status: DISCONTINUED | OUTPATIENT
Start: 2017-09-07 | End: 2017-09-08 | Stop reason: HOSPADM

## 2017-09-07 RX ORDER — LIDOCAINE HYDROCHLORIDE 20 MG/ML
INJECTION, SOLUTION EPIDURAL; INFILTRATION; INTRACAUDAL; PERINEURAL
Status: DISPENSED
Start: 2017-09-07 | End: 2017-09-08

## 2017-09-07 RX ORDER — LIDOCAINE HCL/PF 100 MG/5ML
SYRINGE (ML) INTRAVENOUS
Status: DISCONTINUED | OUTPATIENT
Start: 2017-09-07 | End: 2017-09-07

## 2017-09-07 RX ORDER — PROPOFOL 10 MG/ML
VIAL (ML) INTRAVENOUS
Status: DISPENSED
Start: 2017-09-07 | End: 2017-09-08

## 2017-09-07 RX ADMIN — Medication 3 ML: at 05:09

## 2017-09-07 RX ADMIN — DEXTROSE 8 MG/HR: 50 INJECTION, SOLUTION INTRAVENOUS at 11:09

## 2017-09-07 RX ADMIN — DEXTROSE 8 MG/HR: 50 INJECTION, SOLUTION INTRAVENOUS at 03:09

## 2017-09-07 RX ADMIN — OCTREOTIDE ACETATE 50 MCG/HR: 500 INJECTION, SOLUTION INTRAVENOUS; SUBCUTANEOUS at 05:09

## 2017-09-07 RX ADMIN — OCTREOTIDE ACETATE 50 MCG/HR: 500 INJECTION, SOLUTION INTRAVENOUS; SUBCUTANEOUS at 07:09

## 2017-09-07 RX ADMIN — PROPOFOL 100 MG: 10 INJECTION, EMULSION INTRAVENOUS at 04:09

## 2017-09-07 RX ADMIN — CEFTRIAXONE 1 G: 1 INJECTION, SOLUTION INTRAVENOUS at 06:09

## 2017-09-07 RX ADMIN — PROPOFOL 40 MG: 10 INJECTION, EMULSION INTRAVENOUS at 04:09

## 2017-09-07 RX ADMIN — Medication 3 ML: at 10:09

## 2017-09-07 RX ADMIN — LIDOCAINE HYDROCHLORIDE 75 MG: 20 INJECTION, SOLUTION INTRAVENOUS at 04:09

## 2017-09-07 RX ADMIN — DEXTROSE 8 MG/HR: 50 INJECTION, SOLUTION INTRAVENOUS at 06:09

## 2017-09-07 NOTE — ASSESSMENT & PLAN NOTE
Transfusing 2 units of packed red blood cells; will likely require at least 2 more.  Obtaining follow-up H&H.

## 2017-09-07 NOTE — ASSESSMENT & PLAN NOTE
Management per gastroenterology.  Unable to anticoagulate given the acute GI bleed as noted above.

## 2017-09-07 NOTE — PLAN OF CARE
Problem: Patient Care Overview  Goal: Plan of Care Review  Outcome: Ongoing (interventions implemented as appropriate)  Patient remains free from injury and falls. Denies pain. Received 2 units of packed red blood cells. Spiked fever throughout shift. CXR, urine, and blood work ordered. Tylenol administered. No evidence of bleeding. Patient remains NPO. Plan of care continued.    Problem: Gastrointestinal Bleeding (Adult)  Intervention: Monitor/Manage Gastrointestinal Bleed Characteristics/Effects   09/07/17 0326   Safety Interventions   Medication Review/Management medications reviewed   Monitor/Manage Gastrointestinal Bleed Characteristics/Effects   GI Signs/Symptoms no gastrointestinal signs/symptoms     Intervention: Support/Optimize Psychosocial Response to Illness   09/07/17 0326   Coping/Psychosocial Interventions   Supportive Measures relaxation techniques promoted;verbalization of feelings encouraged     Intervention: Elevate Head of Bed 30-45 Degrees   09/07/17 0200   Positioning   Head of Bed (HOB) HOB at 30-45 degrees       Goal: Signs and Symptoms of Listed Potential Problems Will be Absent, Minimized or Managed (Gastrointestinal Bleeding)  Signs and symptoms of listed potential problems will be absent, minimized or managed by discharge/transition of care (reference Gastrointestinal Bleeding (Adult) CPG).  Outcome: Ongoing (interventions implemented as appropriate)   09/07/17 0326   Gastrointestinal Bleeding   Problems Assessed (GI Bleeding) all   Problems Present (GI Bleeding) situational response

## 2017-09-07 NOTE — H&P (VIEW-ONLY)
"[facility] Hospital Medicine  History & Physical    Patient Name: Alcides Mac  MRN: [id number]  Admission Date: 08/18/2017  Attending Physician: Matheus Denton MD, MPH      PCP:     Primary Doctor No    CC:     Chief Complaint   Patient presents with    Hematemesis     x 1 hour, pt reports weak and dizzy, EMS report they saw a dark color in bucket upon arrival        HISTORY OF PRESENT ILLNESS:     Alcides Mac is a 36 y.o. male that (in part)  has no past medical history on file. Presents to [facility] Emergency Department complaining of dizziness, weakness, and fatigue.  The history is otherwise limited due to the condition of the patient due to somnolence after anesthesia from emergent EGD.  History was primarily taken from the emergency department physician who reports the patient presented with acute onset of nausea and hematemesis that began yesterday.  He had several episodes of hematemesis that continue "every 5 minutes".  This is been spontaneous and was not first overall episode.  He does report having at least 2-3 beers per day.  No known history of gastric varices or peptic ulcers.    In the emergency department routine laboratory studies were obtained.  He was also found to be tachycardic and hypotensive.  Subsequently had an episode of hematemesis in the ED.  An OG tube was placed and returned about 150cc dark red blood.  In addition, the patient had about 400cc dark red hematemesis after placement of the G-tube.  He also had an additional 150cc of dark red blood from stool that appeared to be melanotic.  Gastroenterology was emergently consulted and the patient was taken for EGD.  Operative report pending but preliminary thoughts are that the bleeding is due to gastric varices which were banded.    Hospital medicine has been asked to admit for further evaluation and treatment.       REVIEW OF SYSTEMS:     The available review of system is " addressed in the HPI and is otherwise limited due to the condition of the patient status post anesthesia.       PAST MEDICAL / SURGICAL HISTORY:     Unable to obtain due to somnolence status post anesthesia.    FAMILY HISTORY:     Unable to obtain due to somnolence status post anesthesia.      SOCIAL HISTORY:     Social History   Substance Use Topics    Smoking status: Never Smoker    Smokeless tobacco: Never Used    Alcohol use 1.2 - 1.8 oz/week     2 - 3 Cans of beer per week      Comment: per day     Social History     Social History    Marital status:      Spouse name: N/A    Number of children: N/A    Years of education: N/A     Social History Main Topics    Smoking status: Never Smoker    Smokeless tobacco: Never Used    Alcohol use 1.2 - 1.8 oz/week     2 - 3 Cans of beer per week      Comment: per day    Drug use: No    Sexual activity: Not Asked     Other Topics Concern    None     Social History Narrative    None         ALLERGIES:       Review of patient's allergies indicates:  No Known Allergies        HOME MEDICATIONS:     Prior to Admission medications    Not on File      Unable to obtain due to somnolence status post anesthesia.        HOSPITAL MEDICATIONS:     Scheduled Meds:    cefTRIAXone (ROCEPHIN) IVPB  1 g Intravenous Q24H    etomidate        glycopyrrolate        labetalol        lidocaine  20 mg/mL (2%)        midazolam        neostigmine        phenylephrine HCl in 0.9% NaCl        rocuronium        Banana bag   Intravenous Daily    succinylcholine         Continuous Infusions:    sodium chloride 0.9%      dextrose 5 % and 0.9 % NaCl      octreotide (SANDOSTATIN) infusion 50 mcg/hr (08/18/17 0206)    pantoprazole 40 mg in dextrose 5 % 100 mL infusion (ready to mix system) 8 mg/hr (08/18/17 7653)     PRN Meds: sodium chloride, dextrose 50%, glucagon (human recombinant), insulin aspart, ondansetron, sodium chloride 0.9%      PHYSICAL EXAM:     Wt Readings from  Last 1 Encounters:   08/18/17 0500 95.6 kg (210 lb 12.2 oz)   08/18/17 0235 113.4 kg (250 lb)   08/18/17 0003 113.4 kg (250 lb)     There is no height or weight on file to calculate BMI.  Vitals:    08/18/17 0500 08/18/17 0506 08/18/17 0521 08/18/17 0530   BP: 110/61 110/63 (!) 107/59    BP Location:       Patient Position:       Pulse: 100 100     Resp: (!) 36 (!) 22     Temp: 99 °F (37.2 °C) 99 °F (37.2 °C) 98.8 °F (37.1 °C)    TempSrc: Oral Oral Oral    SpO2: 100%   100%   Weight: 95.6 kg (210 lb 12.2 oz)             -- General appearance: Early middle-aged  male who is well developed. appears somnolent.  Appears stated age   -- Head: normocephalic, atraumatic   -- Eyes: conjunctivae clear. Extraocular muscles intact  -- Nose: Nares normal. Septum midline.   -- Mouth/Throat: lips, mucosa, and tongue normal. no throat erythema.   -- Neck: supple, symmetrical, trachea midline, no JVD and thyroid not grossly enlarged, appears symmetric  -- Lungs: clear to auscultation bilaterally. normal respiratory effort. No use of accessory muscles.   -- Chest wall: no tenderness. equal bilateral chest rise   -- Heart: Rapid rate and regular rhythm. S1, S2 normal.  no click, rub or gallop   -- Abdomen: Mild epigastric tenderness.  soft, non-distended, non-tympanic; bowel sounds normal; no masses  -- Extremities: no cyanosis, clubbing or edema.   -- Pulses: 2+ and symmetric   -- Skin: color normal, texture normal, turgor normal. No rashes or lesions.   -- Neurologic: Somnolent.  good muscle tone. No focal numbness or weakness. CNII-XII intact.     LABORATORY STUDIES:     Recent Results (from the past 36 hour(s))   CBC auto differential    Collection Time: 08/18/17 12:29 AM   Result Value Ref Range    WBC 27.75 (H) 3.90 - 12.70 K/uL    RBC 2.10 (L) 4.60 - 6.20 M/uL    Hemoglobin 7.2 (L) 14.0 - 18.0 g/dL    Hematocrit 22.6 (L) 40.0 - 54.0 %     (H) 82 - 98 fL    MCH 34.3 (H) 27.0 - 31.0 pg    MCHC 31.9 (L) 32.0 - 36.0  g/dL    RDW 13.4 11.5 - 14.5 %    Platelets 228 150 - 350 K/uL    MPV 9.8 9.2 - 12.9 fL    Gran% 74.0 (H) 38.0 - 73.0 %    Lymph% 11.0 (L) 18.0 - 48.0 %    Mono% 2.0 (L) 4.0 - 15.0 %    Eosinophil% 0.0 0.0 - 8.0 %    Basophil% 1.0 0.0 - 1.9 %    Bands 10.0 %    Metamyelocytes 1.0 %    Myelocytes 1.0 %    [place]iso Slight     Poly Occasional     Hypo Occasional     Differential Method Manual    Comprehensive metabolic panel    Collection Time: 08/18/17 12:29 AM   Result Value Ref Range    Sodium 136 136 - 145 mmol/L    Potassium 4.2 3.5 - 5.1 mmol/L    Chloride 100 95 - 110 mmol/L    CO2 17 (L) 23 - 29 mmol/L    Glucose 230 (H) 70 - 110 mg/dL    BUN, Bld 10 6 - 20 mg/dL    Creatinine 1.2 0.5 - 1.4 mg/dL    Calcium 7.6 (L) 8.7 - 10.5 mg/dL    Total Protein 6.1 6.0 - 8.4 g/dL    Albumin 2.5 (L) 3.5 - 5.2 g/dL    Total Bilirubin 1.3 (H) 0.1 - 1.0 mg/dL    Alkaline Phosphatase 169 (H) 55 - 135 U/L    AST 83 (H) 10 - 40 U/L    ALT 24 10 - 44 U/L    Anion Gap 19 (H) 8 - 16 mmol/L    eGFR if African American >60 >60 mL/min/1.73 m^2    eGFR if non African American >60 >60 mL/min/1.73 m^2   Lipase    Collection Time: 08/18/17 12:29 AM   Result Value Ref Range    Lipase 28 4 - 60 U/L   APTT    Collection Time: 08/18/17 12:29 AM   Result Value Ref Range    aPTT 30.4 21.0 - 32.0 sec   Protime-INR    Collection Time: 08/18/17 12:29 AM   Result Value Ref Range    Prothrombin Time 14.6 (H) 9.0 - 12.5 sec    INR 1.4 (H) 0.8 - 1.2   Type & Screen    Collection Time: 08/18/17 12:30 AM   Result Value Ref Range    Group & Rh O POS     Indirect Emelyn NEG    Prepare RBC 2 Units; hemorrhagic shock    Collection Time: 08/18/17 12:30 AM   Result Value Ref Range    UNIT NUMBER [id number]     PRODUCT CODE A8842Y65     DISPENSE STATUS ISSUED     CODING SYSTEM [id number]     Unit Blood Type Code 5100     Unit Blood Type O POS     Unit Expiration [id number]     UNIT NUMBER [id number]     PRODUCT CODE V7457M33     DISPENSE STATUS ISSUED      CODING SYSTEM UGSC510     Unit Blood Type Code 5100     Unit Blood Type O POS     Unit Expiration [id number]        Lab Results   Component Value Date    INR 1.4 (H) 08/18/2017     No results found for: HGBA1C        IMAGING:     Imaging Results          X-Ray Abdomen AP 1 View (Final result)  Result time 08/18/17 02:52:42   Procedure changed from X-Ray Abdomen Flat And Erect     Final result by Hans Alegria MD (08/18/17 02:52:42)                 Impression:        As above.      Electronically signed by: HANS ALEGRIA MD  Date:     08/18/17  Time:    02:52              Narrative:    AP abdomen single view.  Comparison: None.    Enteric tube visualized extending well below the diaphragm.  Nonspecific bowel gas pattern.  No convincing evidence of obstruction.  No free air visualized.                             X-Ray Chest AP Portable (Final result)  Result time 08/18/17 02:06:20   Procedure changed from X-Ray Chest PA And Lateral     Final result by Hans Alegria MD (08/18/17 02:06:20)                 Impression:      No acute cardiopulmonary process identified.      Electronically signed by: HANS ALEGRIA MD  Date:     08/18/17  Time:    02:06              Narrative:    Chest AP single view.  Comparison: None.    Enteric tube extends below the diaphragm outside the field-of-view.  Cardiac silhouette is normal in size.  Lungs are symmetrically expanded.  No evidence of focal consolidative process, pneumothorax, or significant effusion.  Bones appear intact.  No free air visualized beneath the diaphragm.                                CONSULTS:     IP CONSULT TO GI       ASSESSMENT & PLAN:     Primary Diagnosis:  Acute upper GI bleeding    Active Hospital Problems    Diagnosis  POA    *Acute upper GI bleeding [K92.2]  Yes     Priority: 1 - High    Bleeding gastric varices [I86.4]  Yes     Priority: 2     Acute blood loss anemia [D62]  Yes     Priority: 3     Elevated INR [R79.1]  Yes    Elevated AST  (SGOT) [R74.0]  Yes    Hypoalbuminemia [E88.09]  Yes    Hyperglycemia [R73.9]  Yes    Alcohol abuse [F10.10]  Yes      Resolved Hospital Problems    Diagnosis Date Resolved POA   No resolved problems to display.         Alcohol abuse  Suspected chronic alcohol abuse based upon history, elevated AST, gastric varices, and hypoalbuminemia.  · Initiate banana bag  · Case management consult to provide community resources for substance abuse    Acute upper GI bleeding  Acute Upper GI bleeding  · As evidence by physical exam and positive occult blood testing  · Possible etiology includes: hemorrhagic gastritis, ulcer (duodenal / gastric), gastric varices, malignancy, Shruthi Agustin tear  · Monitor with serial H&H  · Give Protonix 80 mg IV bolus followed by 8 mg per hour IV drip  · Sandostatin 50mcg every 8 hours  · Hold all anticoagulation medications  · N.p.o. except  Medications  · Provide IV fluids  · Obtain blood type and screen for transfusion of packed red blood cells  · Maintain two large-bore IVs at all times  · Consulted gastroenterology who performed emergent EGD       Bleeding gastric varices  Pulmonary report from GI indicates this was likely bleeding gastric varices most likely secondary to portal hypertension secondary to cirrhosis of liver secondary to chronic alcohol abuse      Acute blood loss anemia  Due to acute upper GI bleeding as noted above.      Elevated INR  Minimally elevated but likely due to chronic liver disease/cirrhosis.  Likely contributory to his degree of GI bleeding.      Elevated AST (SGOT)  Likely related to chronic alcohol abuse and cirrhosis.      Hypoalbuminemia  Likely malnourished from chronic alcohol use.      Hyperglycemia  Unknown etiology.  Will obtain hemoglobin A1c.  Not a known diabetic.  May have chronic pancreatitis associated with chronic alcohol abuse.          VTE Risk Mitigation     None            Adult PRN medications available   DVT prophylaxis given        DISPOSITION:     Will admit to the Hospital Medicine service for further evaluation and treatment.    Chart reviewed and updated where applicable.    High Risk Conditions:  Patient has a condition that poses threat to life and bodily function: Acute upper GI bleeding      ===============================================================    Matheus Denton MD, MPH  Department of Hospital Medicine   [facility]  [phone] pg  (7pm - 6am)          This note is dictated using Dragon Medical 360 voice recognition software.  There are word recognition mistakes that are occasionally missed on review.

## 2017-09-07 NOTE — NURSING
Patient returned from Endo with Endo stating he needs at least one unit of blood before they can do the procedure.    Will start another line and start transfusions.

## 2017-09-07 NOTE — TRANSFER OF CARE
"Anesthesia Transfer of Care Note    Patient: Alcides Mac    Procedure(s) Performed: Procedure(s) (LRB):  ESOPHAGOGASTRODUODENOSCOPY (EGD) (Left)    Patient location: GI    Anesthesia Type: general    Transport from OR: Transported from OR on room air with adequate spontaneous ventilation    Post pain: adequate analgesia    Post assessment: no apparent anesthetic complications    Post vital signs: stable    Level of consciousness: awake, alert and oriented    Nausea/Vomiting: no nausea/vomiting    Complications: none    Transfer of care protocol was followed      Last vitals:   Visit Vitals  /73 (BP Location: Left arm, Patient Position: Lying)   Pulse 87   Temp 36.7 °C (98.1 °F) (Oral)   Resp 16   Ht 5' 6" (1.676 m)   Wt 90.7 kg (200 lb)   SpO2 96%   BMI 32.28 kg/m²     "

## 2017-09-07 NOTE — ASSESSMENT & PLAN NOTE
Likely due to liver disease from chronic alcoholism.  indication for FFP if patient continues to bleed

## 2017-09-07 NOTE — NURSING
Patient to endo with transporter in his bed.   Blood Transfusion and medication infusing in route.  Patient in stable condition.

## 2017-09-07 NOTE — PROGRESS NOTES
NORIS Lane notified MD: Dr. Matheus Denton  of patient vital signs and mews score.   MD gave new orders.

## 2017-09-07 NOTE — ASSESSMENT & PLAN NOTE
Acute Upper GI bleeding  · As evidence by physical exam and positive occult blood testing; previously diagnosed on last admission  · Most likely etiology includes bleeding gastric varices.  Differential also includes hemorrhagic gastritis, ulcer (duodenal / gastric), gastric varices, malignancy, Shruthi Agustin tear, colitis, colon cancer, internal hemorrhoids  · Monitor with serial H&H  · Give Protonix 80 mg IV bolus followed by 8 mg per hour IV drip  · Sandostatin 50mcg every 8 hours  · Hold all anticoagulation medications  · N.p.o. except  Medications  · Provide IV fluids  · Obtain blood type and screen  · Maintain two large-bore IVs at all times  · Consult gastroenterology  · Transfusing 2 units of packed red blood cells; will likely require at least 2 more.

## 2017-09-07 NOTE — ANESTHESIA POSTPROCEDURE EVALUATION
"Anesthesia Post Evaluation    Patient: Alcides Mac    Procedure(s) Performed: Procedure(s) (LRB):  ESOPHAGOGASTRODUODENOSCOPY (EGD) (Left)    Final Anesthesia Type: general  Patient location during evaluation: GI PACU  Patient participation: Yes- Able to Participate  Level of consciousness: awake and alert, oriented and awake  Post-procedure vital signs: reviewed and stable  Airway patency: patent  PONV status at discharge: No PONV  Anesthetic complications: no      Cardiovascular status: blood pressure returned to baseline  Respiratory status: unassisted, spontaneous ventilation and room air  Hydration status: euvolemic  Follow-up not needed.        Visit Vitals  /85 (BP Location: Left arm, Patient Position: Lying)   Pulse 85   Temp 36.7 °C (98.1 °F) (Oral)   Resp 18   Ht 5' 6" (1.676 m)   Wt 90.7 kg (200 lb)   SpO2 96%   BMI 32.28 kg/m²       Pain/Kike Score: Pain Assessment Performed: Yes (9/7/2017  4:36 PM)  Presence of Pain: denies (9/7/2017  4:36 PM)  Pain Rating Prior to Med Admin: 0 (9/6/2017 11:36 PM)  Pain Rating Post Med Admin: 0 (9/7/2017 12:36 AM)  Kike Score: 10 (9/7/2017  4:21 PM)      "

## 2017-09-07 NOTE — INTERVAL H&P NOTE
"The patient has been examined and the H&P has been reviewed:    I concur with the findings and changes have been noted since the H&P was written: Changes as indicated below.    HPI:  This 36 y.o. 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 Patient was seen last month (august) for esophageal varices where he had an esophagoscopy completed and informed to follow up with [facility]. He notes having 1 blood transfusion at this facility. Patient also reports "white, yellow-charlene, and sticky" stool, cough, fatigue, and SOB recently. SOB occurs when patient ambulates. He notes having rectal bleeding due to hemorrhoids 3-4 days ago which resided on its own. No alleviating factors. Patient denies fever, chills, chest pain, dysuria, numbness, and weakness.     Hospital Course from August 18 to August 21, 2017:   Patient was admitted for evaluation and treatment of upper GI bleed. GI was consulted and the patient underwent emergent EGD. The patient was found to have gastric varices that were banded.  The patient was initially sent to the ICU but went to the floor on 8/18.  The patient was started on an octreotide drip and remained for 72 hours. The patient received blood.  GI continued to follow the patient. The patient was continued on a PPI and octreotide drip. His H/H remained stable.  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 I spoke to Dr. Garcia and he recommended no treatment. Further, he stated the patient can go home on 4 days of cipro, b-blocker and follow up with him in clinic in a week. The patient will be discharged to home today. Activity as tolerated. Diet-low NA. Follow up with PCP and GI in one week.           Active Hospital Problems    Diagnosis  POA    Bleeding gastric varices [I86.4]  Yes     Priority: 1 - High    " Acute blood loss anemia [D62]  Yes     Priority: 2     Portal vein thrombosis [I81]  Yes    Elevated INR [R79.1]  Yes    Elevated AST (SGOT) [R74.0]  Yes    Alcohol abuse [F10.10]  Yes    Hypoalbuminemia [E88.09]  Yes      Resolved Hospital Problems    Diagnosis Date Resolved POA    Acute upper GI bleeding [K92.2] 08/21/2017 Yes     Priority: 1 - High     Bleeding gastric varices  Acute Upper GI bleeding  · As evidence by physical exam and positive occult blood testing; previously diagnosed on last admission  · Most likely etiology includes bleeding gastric varices.  Differential also includes hemorrhagic gastritis, ulcer (duodenal / gastric), gastric varices, malignancy, Shruthi Agustin tear, colitis, colon cancer, internal hemorrhoids  · Monitor with serial H&H  · Give Protonix 80 mg IV bolus followed by 8 mg per hour IV drip  · Sandostatin 50mcg every 8 hours  · Hold all anticoagulation medications  · N.p.o. except  Medications  · Provide IV fluids  · Obtain blood type and screen  · Maintain two large-bore IVs at all times  · Consult gastroenterology  · Transfusing 2 units of packed red blood cells; will likely require at least 2 more.    Acute blood loss anemia  Transfusing 2 units of packed red blood cells; will likely require at least 2 more.  Obtaining follow-up H&H.      Elevated INR  Likely due to liver disease from chronic alcoholism.  indication for FFP if patient continues to bleed      Elevated AST (SGOT)  Elevated due to chronic alcohol abuse      Hypoalbuminemia  Due to advanced liver disease and chronic alcoholism with malnutrition.      Alcohol abuse  Patient was drinking 1 pint of vodka per day before prior hospitalization.  Counseling given.      Portal vein thrombosis  Management per gastroenterology.  Unable to anticoagulate given the acute GI bleed as noted above.          ===============================================================    Matheus Denton MD, MPH  Department of [facility]  Medicine   [facility]  [phone]  (7pm - 6am)

## 2017-09-07 NOTE — ANESTHESIA PREPROCEDURE EVALUATION
09/07/2017  Alcides Mac is a 36 y.o., male without any significant past medical or surgical history presented to the ED with severe upper GI bleeding. The bleeding started today and the patient denied any history or inciting factors. He was acutely anemic from the blood loss and transfusion of RBCs was initiated.   Denies any history of family complications with anesthesia, denies cardiopulmonary disease.  Pre-op Assessment    I have reviewed the Patient Summary Reports.      I have reviewed the Medications.     Review of Systems  Anesthesia Hx:  No previous Anesthesia  Denies Family Hx of Anesthesia complications.    Social:  Alcohol Use    Hematology/Oncology:     Oncology Normal    -- Anemia: Hematology Comments: Anemia secondary to acute blood loss    EENT/Dental:EENT/Dental Normal   Cardiovascular:  Cardiovascular Normal Exercise tolerance: good     Pulmonary:  Pulmonary Normal    Renal/:  Renal/ Normal     Hepatic/GI:   Patient presented with an acute upper GI bleed   Neurological:  Neurology Normal    Endocrine:  Endocrine Normal    Psych:  Psychiatric Normal           Physical Exam  General:  Well nourished, Obesity    Airway/Jaw/Neck:  Airway Findings: Mouth Opening: Normal Tongue: Normal  General Airway Assessment: Adult, Average  Mallampati: II  TM Distance: Normal, at least 6 cm  Jaw/Neck Findings:  Neck ROM: Normal ROM      Dental:  Dental Findings: In tact   Chest/Lungs:  Chest/Lungs Findings: Clear to auscultation     Heart/Vascular:  Heart Findings: Rate: Tachycardia  Rhythm: Regular Rhythm        Mental Status:  Mental Status Findings:  Alert and Oriented, Cooperative         Anesthesia Plan  Type of Anesthesia, risks & benefits discussed:  Anesthesia Type:  general  Patient's Preference:   Intra-op Monitoring Plan: standard ASA monitors  Intra-op Monitoring Plan Comments:   Post Op  Pain Control Plan: multimodal analgesia and IV/PO Opioids PRN  Post Op Pain Control Plan Comments:   Induction:   IV  Beta Blocker:  Patient is not currently on a Beta-Blocker (No further documentation required).       Informed Consent: Patient understands risks and agrees with Anesthesia plan.  Questions answered. Anesthesia consent signed with patient.  ASA Score: 3  emergent   Day of Surgery Review of History & Physical:    H&P update referred to the provider.         Ready For Surgery From Anesthesia Perspective.

## 2017-09-07 NOTE — NURSING
Dr. Denton notified of patient's temp 102.3. New orders for chest x-ray, urine culture and wrights stain, and blood cultures x2.

## 2017-09-08 VITALS
HEART RATE: 82 BPM | SYSTOLIC BLOOD PRESSURE: 176 MMHG | HEIGHT: 66 IN | DIASTOLIC BLOOD PRESSURE: 86 MMHG | WEIGHT: 200 LBS | RESPIRATION RATE: 18 BRPM | OXYGEN SATURATION: 93 % | BODY MASS INDEX: 32.14 KG/M2 | TEMPERATURE: 99 F

## 2017-09-08 LAB
ALBUMIN FLD-MCNC: 0.9 G/DL
APPEARANCE FLD: CLEAR
BASOPHILS # BLD AUTO: 0.05 K/UL
BASOPHILS NFR BLD: 0.3 %
BODY FLD TYPE: NORMAL
COLOR FLD: YELLOW
DIFFERENTIAL METHOD: ABNORMAL
EOSINOPHIL # BLD AUTO: 0.6 K/UL
EOSINOPHIL NFR BLD: 3.7 %
ERYTHROCYTE [DISTWIDTH] IN BLOOD BY AUTOMATED COUNT: 19.3 %
HCT VFR BLD AUTO: 27.6 %
HGB BLD-MCNC: 8.7 G/DL
LYMPHOCYTES # BLD AUTO: 2.2 K/UL
LYMPHOCYTES NFR BLD: 13.2 %
LYMPHOCYTES NFR FLD MANUAL: 22 %
MCH RBC QN AUTO: 28.2 PG
MCHC RBC AUTO-ENTMCNC: 31.5 G/DL
MCV RBC AUTO: 90 FL
MESOTHL CELL NFR FLD MANUAL: 10 %
MONOCYTES # BLD AUTO: 1.7 K/UL
MONOCYTES NFR BLD: 10.2 %
MONOS+MACROS NFR FLD MANUAL: 53 %
NEUTROPHILS # BLD AUTO: 12.2 K/UL
NEUTROPHILS NFR BLD: 72.6 %
NEUTROPHILS NFR FLD MANUAL: 15 %
PLATELET # BLD AUTO: 315 K/UL
PMV BLD AUTO: 8.7 FL
PROT FLD-MCNC: 1.9 G/DL
RBC # BLD AUTO: 3.08 M/UL
SPECIMEN SOURCE: NORMAL
SPECIMEN SOURCE: NORMAL
WBC # BLD AUTO: 16.82 K/UL
WBC # FLD: 426 /CU MM

## 2017-09-08 PROCEDURE — 0W9G3ZX DRAINAGE OF PERITONEAL CAVITY, PERCUTANEOUS APPROACH, DIAGNOSTIC: ICD-10-PCS | Performed by: RADIOLOGY

## 2017-09-08 PROCEDURE — 25000003 PHARM REV CODE 250

## 2017-09-08 PROCEDURE — C9113 INJ PANTOPRAZOLE SODIUM, VIA: HCPCS | Performed by: INTERNAL MEDICINE

## 2017-09-08 PROCEDURE — 85025 COMPLETE CBC W/AUTO DIFF WBC: CPT

## 2017-09-08 PROCEDURE — 84157 ASSAY OF PROTEIN OTHER: CPT

## 2017-09-08 PROCEDURE — 63600175 PHARM REV CODE 636 W HCPCS: Performed by: INTERNAL MEDICINE

## 2017-09-08 PROCEDURE — 99000 SPECIMEN HANDLING OFFICE-LAB: CPT

## 2017-09-08 PROCEDURE — 89051 BODY FLUID CELL COUNT: CPT

## 2017-09-08 PROCEDURE — 25000003 PHARM REV CODE 250: Performed by: INTERNAL MEDICINE

## 2017-09-08 PROCEDURE — A4216 STERILE WATER/SALINE, 10 ML: HCPCS

## 2017-09-08 PROCEDURE — 82042 OTHER SOURCE ALBUMIN QUAN EA: CPT

## 2017-09-08 PROCEDURE — 36415 COLL VENOUS BLD VENIPUNCTURE: CPT

## 2017-09-08 PROCEDURE — 63600175 PHARM REV CODE 636 W HCPCS

## 2017-09-08 RX ORDER — CIPROFLOXACIN 500 MG/1
500 TABLET ORAL EVERY 12 HOURS
Qty: 14 TABLET | Refills: 0 | Status: SHIPPED | OUTPATIENT
Start: 2017-09-08 | End: 2017-09-15

## 2017-09-08 RX ORDER — FUROSEMIDE 40 MG/1
40 TABLET ORAL DAILY
Qty: 30 TABLET | Refills: 0 | Status: ON HOLD | OUTPATIENT
Start: 2017-09-08 | End: 2018-01-02

## 2017-09-08 RX ORDER — METOPROLOL TARTRATE 25 MG/1
25 TABLET, FILM COATED ORAL 2 TIMES DAILY
Qty: 60 TABLET | Refills: 0 | Status: ON HOLD | OUTPATIENT
Start: 2017-09-08 | End: 2018-01-02

## 2017-09-08 RX ORDER — PANTOPRAZOLE SODIUM 40 MG/1
40 TABLET, DELAYED RELEASE ORAL DAILY
Status: DISCONTINUED | OUTPATIENT
Start: 2017-09-08 | End: 2017-09-08 | Stop reason: HOSPADM

## 2017-09-08 RX ORDER — SPIRONOLACTONE 25 MG/1
25 TABLET ORAL DAILY
Qty: 30 TABLET | Refills: 0 | Status: ON HOLD | OUTPATIENT
Start: 2017-09-08 | End: 2018-01-02

## 2017-09-08 RX ADMIN — OCTREOTIDE ACETATE 50 MCG/HR: 500 INJECTION, SOLUTION INTRAVENOUS; SUBCUTANEOUS at 03:09

## 2017-09-08 RX ADMIN — Medication 3 ML: at 06:09

## 2017-09-08 RX ADMIN — PANTOPRAZOLE SODIUM 40 MG: 40 TABLET, DELAYED RELEASE ORAL at 08:09

## 2017-09-08 RX ADMIN — DEXTROSE 8 MG/HR: 50 INJECTION, SOLUTION INTRAVENOUS at 03:09

## 2017-09-08 NOTE — INTERVAL H&P NOTE
The patient has been examined and the H&P has been reviewed:    Changes since H&P noted in progress notes since admission. No contraindication to planned procedure    Anesthesia/Surgery risks, benefits and alternative options discussed and understood by patient/family.          Active Hospital Problems    Diagnosis  POA    *Acute blood loss anemia [D62]  Yes    Gastrointestinal hemorrhage [K92.2]  Yes    Portal vein thrombosis [I81]  Yes    Elevated INR [R79.1]  Yes    Elevated AST (SGOT) [R74.0]  Yes    Alcohol abuse [F10.10]  Yes    Hypoalbuminemia [E88.09]  Yes    Bleeding gastric varices [I86.4]  Yes      Resolved Hospital Problems    Diagnosis Date Resolved POA    Acute upper GI bleeding [K92.2] 08/21/2017 Yes

## 2017-09-08 NOTE — PROGRESS NOTES
Returned to floor no distress noted, dressing noted to right side, denies pain, safety maintained.

## 2017-09-08 NOTE — PROGRESS NOTES
Follow-up Information     [facility]. Go on 9/13/2017.    Why:  Outpatient Services, PCP Follow-up Appointment, Please arrive to clinic for 1:00PM, You will see Dr. Meek  Contact information:  [address]  [facility] [address]  [phone]             [facility].    Specialties:  Neurosurgery, Plastic Surgery, Podiatry, Surgical Oncology, Allergy, Cardiothoracic Surgery, Otolaryngology, Gastroenterology, Breast Surgery, Oral Surgery, Oral and Maxillofacial Surgery, Cardiology, Bariatrics, Internal Medicine, Family Medicine, Colon and Rectal Surgery, Dental General Practice, Gynecology, Orthopedic Surgery, Genetics, Endocrinology, Vascular Surgery, Physical Medicine and Rehabilitation, Urology, Neurology, Dermatology, Rheumatology, Occupational Therapy  Why:  Outpatient Services, GI Follow-up Appointment, A referral has been sent on your behalf to Gastro  Contact information:  [address] [facility] [address]  [phone]                   [facility]    WRITTEN HEALTHCARE AND DISCHARGE INFORMATION                            Help at Home           [phone]  After discharge for assistance [facility] On Call Nurse Care Line 24/7  Assistance    Things You are responsible For To Manage Your Care At Home:  1.    Getting your prescriptions filled   2.    Taking your medications as directed, DO NOT MISS ANY DOSES!  3.    Going to your follow-up doctor appointment. This is important because it  allow the doctor to monitor your progress and determine if  any changes need to made to your treatment plan.     Thank you for choosing [facility] for your care.  Please answer any calls you may receive from [facility] we want to continue to support you as you manage your healthcare needs. [facility] is happy to have the opportunity to serve you.     Sincerely,  Your [facility] Team,  Marian Spain LMSW   II  [phone]

## 2017-09-08 NOTE — PROGRESS NOTES
"Gastroenterology    CC: Anemia/Ascites    HPI 36 y.o. male with no overt bleeding.  No N/V.  No pain    PMHX - cirrhosis    Review of Systems  General ROS: negative for chills, fever  Cardiovascular ROS: no chest pain or dyspnea     Physical Examination  BP (!) 145/92   Pulse 86   Temp 99.6 °F (37.6 °C)   Resp 18   Ht 5' 6" (1.676 m)   Wt 90.7 kg (200 lb)   SpO2 (!) 94%   BMI 32.28 kg/m²   General appearance: alert, cooperative, no distress  HENT: Normocephalic, atraumatic, neck symmetrical, no nasal discharge   Eyes: conjunctivae/corneas clear, no icterus, EOM's intact  Lungs: resp non-labored  Heart: regular rate   Abdomen: soft, non-tender; ND  Extremities: extremities symmetric; no clubbing, cyanosis  Neurologic: Alert and oriented X 3, MAEW    Labs:  H/H stable    Assessment:     Anemia - slow blood loss from portal hypertension could be contributing.  H/H stable.  EGD yest. VSS.    Cirrhosis - ascites noted on last CT last month.     Plan:   - complete 7 days abx - can switch to cipro on D/C  - paracentesis with cell count today if sufficient fluid present  - PPI daily  - start on low dose non-selective beta blocker on D/C, can be titrated as outpt  - will need outpt colonoscopy        "

## 2017-09-08 NOTE — PROGRESS NOTES
JEFFRY contacted [facility] @ [phone] to schedule PCP follow-up, JEFFRY spoke to Lucero. Patient will see Dr. Meek on 9/13/17 @ 1:00pm. JEFFRY faxed clinicals to [facility] @ [phone] to refer patient for follow-up appointment with outpatient GI clinic enclosed: face sheet, H&P, MD notes, consults, and imaging.

## 2017-09-08 NOTE — CONSULTS
Radiology Post-Procedure Note    Pre Op Diagnosis: Ascites  Post Op Diagnosis: Same    Procedure: Paracentesis    Procedure performed by: Db Mcnamara MD    Written Informed Consent Obtained: Yes  Specimen Removed: YES 2.5L  Estimated Blood Loss: Minimal    Findings:   Successful paracentesis.  Albumin administered PRN per protocol.    Patient tolerated procedure well.    Db Mcnamara MD  Staff Interventional Radiologist  Department of Radiology

## 2017-09-08 NOTE — PLAN OF CARE
09/08/17 1301   Discharge Assessment   Assessment Type Discharge Planning Assessment   Confirmed/corrected address and phone number on facesheet? Yes   Assessment information obtained from? Patient   Prior to hospitilization cognitive status: Alert/Oriented;No Deficits   Prior to hospitalization functional status: Independent   Current cognitive status: Alert/Oriented;No Deficits   Current Functional Status: Independent   Lives With spouse;child(sveta), dependent   Able to Return to Prior Arrangements yes   Is patient able to care for self after discharge? Yes   Patient's perception of discharge disposition home or selfcare   Readmission Within The Last 30 Days current reason for admission unrelated to previous admission   Patient currently being followed by outpatient case management? No   Patient currently receives any other outside agency services? No   Equipment Currently Used at Home none   Do you have any problems affording any of your prescribed medications? No  (Patient uninsured, pending medicaid)   Is the patient taking medications as prescribed? (Undetermined)   Does the patient have transportation home? Yes   Transportation Available car;family or friend will provide   Does the patient receive services at the [facility]madin Clinic? No   Discharge Plan A Home with family  (PCP F/U)   Discharge Plan B Home with family   Patient/Family In Agreement With Plan yes

## 2017-09-08 NOTE — PLAN OF CARE
Problem: Patient Care Overview  Goal: Plan of Care Review  Outcome: Ongoing (interventions implemented as appropriate)  Patient resting throughout shift. Remains afebrile. No bleeding noted. Abdomen appears more distended then last shift, but soft to touch. Tolerating clear liquid diet. 1500 ml fluid restriction in effect. Protonix and Sandostatin infusing as ordered. Plan of care continued.

## 2017-09-08 NOTE — PLAN OF CARE
JEFFRY met with patient to provide and review discharge follow-up instructions. SW discussed with patient the things he will be responsible for to manage his health at home by utilizing teach back method, patient verbalized understanding. JEFFRY informed nurse Rhiannon  completed all discharge planning for patient and she could complete her nursing education discharge with patient.         09/08/17 7216   Final Note   Assessment Type Final Discharge Note   Discharge Disposition Home   Hospital Follow Up  Appt(s) scheduled? Yes   Discharge plans and expectations educations in teach back method with documentation complete? Yes   Right Care Referral Info   Post Acute Recommendation No Care

## 2017-09-08 NOTE — HPI
"This 36 y.o. 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 Patient was seen last month (august) for esophageal varices where he had an esophagoscopy completed and informed to follow up with [facility]. He notes having 1 blood transfusion at this facility. Patient also reports "white, yellow-charlene, and sticky" stool, cough, fatigue, and SOB recently. SOB occurs when patient ambulates. He notes having rectal bleeding due to hemorrhoids 3-4 days ago which resided on its own. No alleviating factors. Patient denies fever, chills, chest pain, dysuria, numbness, and weakness.      Hospital Course from August 18 to August 21, 2017:   Patient was admitted for evaluation and treatment of upper GI bleed. GI was consulted and the patient underwent emergent EGD. The patient was found to have gastric varices that were banded.  The patient was initially sent to the ICU but went to the floor on 8/18.  The patient was started on an octreotide drip and remained for 72 hours. The patient received blood.  GI continued to follow the patient. The patient was continued on a PPI and octreotide drip. His H/H remained stable.  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 I spoke to Dr. Garcia and he recommended no treatment. Further, he stated the patient can go home on 4 days of cipro, b-blocker and follow up with him in clinic in a week. The patient will be discharged to home today. Activity as tolerated. Diet-low NA. Follow up with PCP and GI in one week  "

## 2017-09-08 NOTE — PROGRESS NOTES
Discharge instructions reviewed with patient, verbalized understanding, no concerns voiced at this time, ambulated downstairs,safety maintained.

## 2017-09-08 NOTE — HOSPITAL COURSE
"This 36 y.o. 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 Patient was seen last month (august) for esophageal varices where he had an esophagoscopy completed and informed to follow up with [facility]. He notes having 1 blood transfusion at this facility. Patient also reports "white, yellow-charlene, and sticky" stool, cough, fatigue, and SOB recently. SOB occurs when patient ambulates. He notes having rectal bleeding due to hemorrhoids 3-4 days ago which resided on its own. No alleviating factors. Patient denies fever, chills, chest pain, dysuria, numbness, and weakness.   Patient was admitted for evaluation and treatment of upper GI bleed. GI was consulted and the patient underwent emergent EGD. The patient was found to have gastric varices stage 2 with no sign of active bleeding,she was on PPI and octerotid drip.he received total of 4 pack PRBC  and  H/H much improved,no sign of active bleeding has been seen,he was on empiric IV abx.he had paracentesis with over 2.5 liter fluid removal.he has been discharged home with PO Abx,BB,lasix and aldactone,and follow up with PCP.he has been strongly encouraged avoid alcohol consumption.  "

## 2017-09-08 NOTE — DISCHARGE SUMMARY
"[facility] Hospital Medicine  Discharge Summary      Patient Name: Alcides Mac  MRN: [id number]  Admission Date: 9/6/2017  Hospital Length of Stay: 2 days  Discharge Date and Time:  09/08/2017 12:08 PM  Attending Physician: Dwain Alonzo MD   Discharging Provider: Dwain Alonzo MD  Primary Care Provider: Primary Doctor No      HPI:   This 36 y.o. 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 Patient was seen last month (august) for esophageal varices where he had an esophagoscopy completed and informed to follow up with [facility]. He notes having 1 blood transfusion at this facility. Patient also reports "white, yellow-charlene, and sticky" stool, cough, fatigue, and SOB recently. SOB occurs when patient ambulates. He notes having rectal bleeding due to hemorrhoids 3-4 days ago which resided on its own. No alleviating factors. Patient denies fever, chills, chest pain, dysuria, numbness, and weakness.      Hospital Course from August 18 to August 21, 2017:   Patient was admitted for evaluation and treatment of upper GI bleed. GI was consulted and the patient underwent emergent EGD. The patient was found to have gastric varices that were banded.  The patient was initially sent to the ICU but went to the floor on 8/18.  The patient was started on an octreotide drip and remained for 72 hours. The patient received blood.  GI continued to follow the patient. The patient was continued on a PPI and octreotide drip. His H/H remained stable.  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 I spoke to Dr. Garcia and he recommended no treatment. Further, he stated the patient can go home on 4 days of cipro, b-blocker and follow up with him in clinic in a week. The patient will be discharged to home today. " "Activity as tolerated. Diet-low NA. Follow up with PCP and GI in one week    Procedure(s) (LRB):  ESOPHAGOGASTRODUODENOSCOPY (EGD) (Left)      Indwelling Lines/Drains at time of discharge:   Lines/Drains/Airways     Airway                 Airway - Non-Surgical 09/07/17 1604 Nasal Cannula less than 1 day              Hospital Course:   This 36 y.o. 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 Patient was seen last month (august) for esophageal varices where he had an esophagoscopy completed and informed to follow up with [facility]. He notes having 1 blood transfusion at this facility. Patient also reports "white, yellow-charlene, and sticky" stool, cough, fatigue, and SOB recently. SOB occurs when patient ambulates. He notes having rectal bleeding due to hemorrhoids 3-4 days ago which resided on its own. No alleviating factors. Patient denies fever, chills, chest pain, dysuria, numbness, and weakness.   Patient was admitted for evaluation and treatment of upper GI bleed. GI was consulted and the patient underwent emergent EGD. The patient was found to have gastric varices stage 2 with no sign of active bleeding,she was on PPI and octerotid drip.he received total of 4 pack PRBC  and  H/H much improved,no sign of active bleeding has been seen,he was on empiric IV abx.he had paracentesis with over 2.5 liter fluid removal.he has been discharged home with PO Abx,BB,lasix and aldactone,and follow up with PCP.he has been strongly encouraged avoid alcohol consumption.     Consults:   Consults         Status Ordering Provider     Consult to Anesthesiology  Once     Provider:  Angelica Jacobs MD    Acknowledged KARYNA GARCIA     Inpatient consult to Gastroenterology  Once     Provider:  Karyna Garcia MD    Acknowledged STEFAN SANCHEZ     Inpatient consult to " Interventional Radiology  Once     Provider:  (Not yet assigned)    Completed MANUEL BANEGAS          Significant Diagnostic Studies: Labs:   CMP   Recent Labs  Lab 09/06/17  1333      K 3.9      CO2 22*   *   BUN 7   CREATININE 0.8   CALCIUM 7.8*   PROT 6.4   ALBUMIN 2.1*   BILITOT 0.6   ALKPHOS 128   AST 52*   ALT 17   ANIONGAP 7*   ESTGFRAFRICA >60   EGFRNONAA >60    and CBC   Recent Labs  Lab 09/07/17  1844 09/07/17  1948 09/08/17  0816   WBC 18.99* 20.77* 16.82*   HGB 8.6* 9.3* 8.7*   HCT 27.0* 29.2* 27.6*    337 315       Pending Diagnostic Studies:     Procedure Component Value Units Date/Time    Albumin, Peritoneal, Pleural Fluid or NIRAJ Drainage, In-House Peritoneal Fluid [[id number]] Collected:  09/08/17 1022    Order Status:  Sent Lab Status:  In process Updated:  09/08/17 1029    Specimen:  Body Fluid     Freeze and Hold,  [[id number]] Collected:  09/08/17 1022    Order Status:  Sent Lab Status:  No result     Specimen:  Body Fluid from Peritoneal Fluid     IR Paracentesis with Imaging [[id number]] Resulted:  09/08/17 1022    Order Status:  Sent Lab Status:  In process Updated:  09/08/17 1022    Protein, Peritoneal, Pleural Fluid or NIRAJ Drainage, In-House Peritoneal Fluid [[id number]] Collected:  09/08/17 1022    Order Status:  Sent Lab Status:  In process Updated:  09/08/17 1029    Specimen:  Body Fluid     WBC & Diff,Body Fluid Peritoneal Fluid [[id number]] Collected:  09/08/17 1022    Order Status:  Sent Lab Status:  In process Updated:  09/08/17 1035    Specimen:  Body Fluid         Final Active Diagnoses:    Diagnosis Date Noted POA    PRINCIPAL PROBLEM:  Acute blood loss anemia [D62] 09/07/2017 Yes    Gastrointestinal hemorrhage [K92.2] 09/07/2017 Yes    Portal vein thrombosis [I81] 08/21/2017 Yes    Elevated INR [R79.1] 08/18/2017 Yes    Elevated AST (SGOT) [R74.0] 08/18/2017 Yes    Alcohol abuse [F10.10] 08/18/2017 Yes    Hypoalbuminemia [E88.09] 08/18/2017 Yes     Bleeding gastric varices [I86.4] 08/18/2017 Yes      Problems Resolved During this Admission:    Diagnosis Date Noted Date Resolved POA    Acute upper GI bleeding [K92.2] 08/18/2017 08/21/2017 Yes      No new Assessment & Plan notes have been filed under this hospital service since the last note was generated.  Service: Hospital Medicine      Discharged Condition: stable    Disposition: Home or Self Care    Follow Up:  Follow-up Information     [facility]  In 1 week.               Patient Instructions:     Diet general     Activity as tolerated       Medications:  Reconciled Home Medications:   Current Discharge Medication List      START taking these medications    Details   ciprofloxacin HCl (CIPRO) 500 MG tablet Take 1 tablet (500 mg total) by mouth every 12 (twelve) hours.  Qty: 14 tablet, Refills: 0      furosemide (LASIX) 40 MG tablet Take 1 tablet (40 mg total) by mouth once daily.  Qty: 30 tablet, Refills: 0      metoprolol tartrate (LOPRESSOR) 25 MG tablet Take 1 tablet (25 mg total) by mouth 2 (two) times daily.  Qty: 60 tablet, Refills: 0      spironolactone (ALDACTONE) 25 MG tablet Take 1 tablet (25 mg total) by mouth once daily.  Qty: 30 tablet, Refills: 0         STOP taking these medications       carvedilol (COREG) 3.125 MG tablet Comments:   Reason for Stopping:             Time spent on the discharge of patient: 30  minutes    HOS POC IP DISCHARGE SUMMARY    Dwain Alonzo MD  Department of Hospital Medicine  [facility]

## 2017-09-08 NOTE — H&P (VIEW-ONLY)
"Chief Complaint:  "I have low blood levels."    HPI:  The patient is a 36 year old man with a history of alcohol cirrhosis presenting with anemia.  He is well known to the GI Service as he presented a few weeks ago with hematemesis and rectal bleeding.   He underwent an EGD on 8/18/17 and he had grade II esophageal varices s/p banding x 4.  A hepatitis panel at that time was negative and it was suspected that he has alcoholic cirrhosis.  A doppler ultrasound also showed concern for stenosis with occlusion of the superior mesenteric, main portal, and posterior right portal veins.  A CT scan was obtained to better visualize this, but did not provide any further information.  He felt weak and had coughing, so he went to the [facility], where he was found to be anemic.  At [facility], his H/H was 4.5/15.8.  He denies having any hematemesis or melena, but he did have suspected hemorrhoidal bleeding a few days ago, which resolved.  He does not have abdominal pain, weight loss, nausea, emesis, diarrhea, or constipation.  The patient does not take NSAIDs.  It was planned to repeat the EGD in 1 month to retreat the varices.    No past medical history on file.    No past surgical history on file.    No family history on file.    Social History     Social History    Marital status:      Spouse name: N/A    Number of children: N/A    Years of education: N/A     Occupational History    Not on file.     Social History Main Topics    Smoking status: Never Smoker    Smokeless tobacco: Never Used    Alcohol use 1.2 - 1.8 oz/week     2 - 3 Cans of beer per week      Comment: per day    Drug use: No    Sexual activity: Not on file     Other Topics Concern    Not on file     Social History Narrative    No narrative on file      octreotide  50 mcg Intravenous ED 1 Time    pantoprazole  40 mg Intravenous ED 1 Time     Review of patient's allergies indicates:  No Known Allergies    ROS:  No chest pain or " dyspnea.  No dysuria.  No heartburn or dysphagia.  Otherwise as stated above.  Ten other systems negative.    Vitals:    09/06/17 1300 09/06/17 1451 09/06/17 1453 09/06/17 1455   BP: 116/74 112/69 (!) 107/58 112/61   BP Location: Left arm Left arm Left arm Left arm   Patient Position: Lying      Pulse: 93 92 96 97   Resp:       Temp: (!) 39.2 °F (4 °C)      SpO2: 100%      Weight:       Height:         P.E.:  GEN: A x O x 3, NAD  SKIN: No jaundice  HEENT: EOMI, PERRL, anicteric sclera  CV: RRR, no M/R/G  Chest: CTA B  Abdomen: soft, NTND, normoactive BS  Ext: No C/C/E.  2+ dorsalis pedis pulses B  Neuro: No asterixes or tremors.  CN II-XII intact  Musculoskeletal: 5/5 strength bilaterally    Labs:  Recent Results (from the past 336 hour(s))   CBC auto differential    Collection Time: 09/06/17  1:33 PM   Result Value Ref Range    WBC 15.60 (H) 3.90 - 12.70 K/uL    Hemoglobin 4.5 (LL) 14.0 - 18.0 g/dL    Hematocrit 15.8 (LL) 40.0 - 54.0 %    Platelets 424 (H) 150 - 350 K/uL     CMP  Sodium   Date Value Ref Range Status   09/06/2017 137 136 - 145 mmol/L Final     Potassium   Date Value Ref Range Status   09/06/2017 3.9 3.5 - 5.1 mmol/L Final     Chloride   Date Value Ref Range Status   09/06/2017 108 95 - 110 mmol/L Final     CO2   Date Value Ref Range Status   09/06/2017 22 (L) 23 - 29 mmol/L Final     Glucose   Date Value Ref Range Status   09/06/2017 131 (H) 70 - 110 mg/dL Final     BUN, Bld   Date Value Ref Range Status   09/06/2017 7 6 - 20 mg/dL Final     Creatinine   Date Value Ref Range Status   09/06/2017 0.8 0.5 - 1.4 mg/dL Final     Calcium   Date Value Ref Range Status   09/06/2017 7.8 (L) 8.7 - 10.5 mg/dL Final     Total Protein   Date Value Ref Range Status   09/06/2017 6.4 6.0 - 8.4 g/dL Final     Albumin   Date Value Ref Range Status   09/06/2017 2.1 (L) 3.5 - 5.2 g/dL Final     Total Bilirubin   Date Value Ref Range Status   09/06/2017 0.6 0.1 - 1.0 mg/dL Final     Comment:     For infants and  newborns, interpretation of results should be based  on gestational age, weight and in agreement with clinical  observations.  Premature Infant recommended reference ranges:  Up to 24 hours.............<8.0 mg/dL  Up to 48 hours............<12.0 mg/dL  3-5 days..................<15.0 mg/dL  6-29 days.................<15.0 mg/dL       Alkaline Phosphatase   Date Value Ref Range Status   09/06/2017 128 55 - 135 U/L Final     AST   Date Value Ref Range Status   09/06/2017 52 (H) 10 - 40 U/L Final     ALT   Date Value Ref Range Status   09/06/2017 17 10 - 44 U/L Final     Anion Gap   Date Value Ref Range Status   09/06/2017 7 (L) 8 - 16 mmol/L Final     eGFR if    Date Value Ref Range Status   09/06/2017 >60 >60 mL/min/1.73 m^2 Final     eGFR if non    Date Value Ref Range Status   09/06/2017 >60 >60 mL/min/1.73 m^2 Final     Comment:     Calculation used to obtain the estimated glomerular filtration  rate (eGFR) is the CKD-EPI equation. Since race is unknown   in our information system, the eGFR values for   -American and Non--American patients are given   for each creatinine result.           Recent Labs  Lab 09/06/17  1333   INR 1.2   APTT 27.5     A/P:  The patient is a 36 year old man with a history of alcohol cirrhosis presenting with anemia.  1.  Anemia - the patient presented with a profound anemia.  He has not noticed any gross bleeding, except supposed hemorrhoidal bleeding a few days ago.  As he recently had suspected bleeding varices that required banding, he can be transfused pRBCs today and undergo a repeat EGD tomorrow.  He should not be transfused over an H/H of 8/24, as this can cause the varices to burst.  A protonix drip, octreotide drip, and ceftriaxone will be started.  Once his anemia improves, a paracentesis can be performed to evaluate for spontaneous bacterial peritonitis.  Alcohol cessation was encouraged.    Thank you for this consult.

## 2017-09-09 LAB — BACTERIA UR CULT: NORMAL

## 2017-09-10 LAB
BLD PROD TYP BPU: NORMAL
BLD PROD TYP BPU: NORMAL
BLOOD UNIT EXPIRATION DATE: NORMAL
BLOOD UNIT EXPIRATION DATE: NORMAL
BLOOD UNIT TYPE CODE: 5100
BLOOD UNIT TYPE CODE: 5100
BLOOD UNIT TYPE: NORMAL
BLOOD UNIT TYPE: NORMAL
CODING SYSTEM: NORMAL
CODING SYSTEM: NORMAL
DISPENSE STATUS: NORMAL
DISPENSE STATUS: NORMAL
TRANS ERYTHROCYTES VOL PATIENT: NORMAL ML
TRANS ERYTHROCYTES VOL PATIENT: NORMAL ML

## 2017-09-12 ENCOUNTER — PATIENT OUTREACH (OUTPATIENT)
Dept: ADMINISTRATIVE | Facility: CLINIC | Age: 37
End: 2017-09-12

## 2017-09-12 LAB
BACTERIA BLD CULT: NORMAL
BACTERIA BLD CULT: NORMAL

## 2017-09-12 NOTE — PROGRESS NOTES
C3 nurse attempted to contact patient. No answer. The following message was left for the patient to return the call:  Good afternoon  I am a nurse calling on behalf of [facility] from the [facility].  This is a Transitional Care Call for Alcides Mac . When you have a moment please contact us at [phone] or [phone] Monday through Friday, between the hours of 8 am to 4 pm. We look forward to speaking with you. On behalf of [facility] have a nice day.    The patient has a scheduled HOSFU appointment at [facility] on 9/13/17 @ 1pm. Message sent to Physician staff. (Utilize language line [phone])

## 2017-09-12 NOTE — PHYSICIAN QUERY
"PT Name: Alcides Mac  MR #: [id number]     Physician Query Form - Documentation Clarification      CDS/: SOLOMON Samaniego, RN, CCDS               Contact information: [email or web address]    This form is a permanent document in the medical record.     Query Date: September 11, 2017    By submitting this query, we are merely seeking further clarification of documentation. Please utilize your independent clinical judgment when addressing the question(s) below.    The Medical record reflects the following:    Supporting Clinical Findings Location in Medical Record     Hypoalbuminemia   Likely malnourished from chronic alcohol use.    Alcohol abuse  Suspected chronic alcohol abuse based upon history, elevated AST, gastric varices, and hypoalbuminemia.  · Initiate banana bag  · Case management consult to provide community resources for substance abuse     Diet-low NA. Follow up with PCP and GI in one week.      General: Well developed, well nourished, no apparent distress    8/21 Progress Note by Hospital Medicine      8/18 H&P                  8/21 DC Summary      8/18 Gastroenterology Consult Note                                                                                      Doctor, Please specify diagnosis or diagnoses associated with above clinical findings.    Please clarify "Likely malnourished from chronic alcohol use."     Provider Use Only      (   ) Mild Protein-calorie malnutrition    (   ) Malnutrition ruled out    ( x  ) Other (specify): _Moderate malnutrition ________________________________________________                                                                                                                   [  ] Clinically undetermined            "

## 2017-09-13 NOTE — PATIENT INSTRUCTIONS
Upper GI Bleeding (Stable)  Your upper gastrointestinal (GI) tract includes your esophagus, stomach, and upper small intestine. You have signs of bleeding from your upper GI tract. You may have vomited or coughed up blood or coffee-ground like material. Or you may have black or tarry stools. Very small amounts of GI bleeding may not be visible and can only be found by a test of the stool.  Causes of upper GI bleeding can include:  · Tear in the lining of the esophagus  · Enlarged veins in the esophagus  · An ulcer in the stomach or top of the small intestine  · Severe irritation of the stomach  · Inflammation of the digestive tract  Note: A bloody nose or mouth or dental problems may cause blood to be swallowed and vomited up again. This is not true GI bleeding. Iron supplements and medicines for diarrhea and upset stomach can cause black stools. This is not GI bleeding and is not a cause for concern.  Home care  Depending on the cause of your bleeding, care may include the following:  · You may be given medicines to help protect your GI tract, treat your problem, and promote healing. Take these as directed.  · Avoid NSAIDs, such as aspirin, ibuprofen, or naproxen. They can irritate the stomach and cause further bleeding. If you are taking these medicines for other medical reasons, talk to your healthcare provider before you stop them.    · Avoid alcohol, caffeine, and tobacco, which can delay healing and worsen your problem.  Follow-up care  Follow up with your healthcare provider as advised. Further tests may need to be done to find the cause of your bleeding.  When to seek medical advice  Call your healthcare provider for any of the following:  · Stomach pain appears or gets worse  · Pain spreads to the neck, back, shoulder, or arm  · Weakness or dizziness  · Swelling of your abdomen  · Red blood in your stool  · Fever of 100.4F (38C) or higher, or as directed by your healthcare provider  Call 911  Get  emergency medical care if any of these occur:  · Trouble breathing or swallowing  · Severe dizziness  · Loss of consciousness  · Vomiting blood or large amounts of blood in the stool  Date Last Reviewed: 6/24/2015  © 9380-8222 [employer]. [address] [place] [address], [place] [address]. All rights reserved. This information is not intended as a substitute for professional medical care. Always follow your healthcare professional's instructions.

## 2017-12-30 ENCOUNTER — HOSPITAL ENCOUNTER (INPATIENT)
Facility: HOSPITAL | Age: 37
LOS: 3 days | Discharge: HOME | DRG: 812 | End: 2018-01-02
Attending: EMERGENCY MEDICINE | Admitting: HOSPITALIST

## 2017-12-30 DIAGNOSIS — D64.9 SEVERE ANEMIA: Primary | ICD-10-CM

## 2017-12-30 DIAGNOSIS — R56.9 SEIZURE: ICD-10-CM

## 2017-12-30 DIAGNOSIS — M25.511 RIGHT SHOULDER PAIN: ICD-10-CM

## 2017-12-30 DIAGNOSIS — D64.9 SYMPTOMATIC ANEMIA: ICD-10-CM

## 2017-12-30 DIAGNOSIS — R90.89 ABNORMAL CT OF BRAIN: ICD-10-CM

## 2017-12-30 LAB
ABO + RH BLD: NORMAL
ALBUMIN SERPL BCP-MCNC: 3.2 G/DL
ALP SERPL-CCNC: 140 U/L
ALT SERPL W/O P-5'-P-CCNC: 34 U/L
ANION GAP SERPL CALC-SCNC: 10 MMOL/L
ANISOCYTOSIS BLD QL SMEAR: SLIGHT
AST SERPL-CCNC: 103 U/L
BASOPHILS # BLD AUTO: 0.02 K/UL
BASOPHILS NFR BLD: 0.2 %
BILIRUB SERPL-MCNC: 1 MG/DL
BLD GP AB SCN CELLS X3 SERPL QL: NORMAL
BLD PROD TYP BPU: NORMAL
BLD PROD TYP BPU: NORMAL
BLOOD UNIT EXPIRATION DATE: NORMAL
BLOOD UNIT EXPIRATION DATE: NORMAL
BLOOD UNIT TYPE CODE: 5100
BLOOD UNIT TYPE CODE: 5100
BLOOD UNIT TYPE: NORMAL
BLOOD UNIT TYPE: NORMAL
BUN SERPL-MCNC: 11 MG/DL
CALCIUM SERPL-MCNC: 8.6 MG/DL
CHLORIDE SERPL-SCNC: 101 MMOL/L
CO2 SERPL-SCNC: 21 MMOL/L
CODING SYSTEM: NORMAL
CODING SYSTEM: NORMAL
CREAT SERPL-MCNC: 0.8 MG/DL
DIFFERENTIAL METHOD: ABNORMAL
DISPENSE STATUS: NORMAL
DISPENSE STATUS: NORMAL
EOSINOPHIL # BLD AUTO: 0 K/UL
EOSINOPHIL NFR BLD: 0.2 %
ERYTHROCYTE [DISTWIDTH] IN BLOOD BY AUTOMATED COUNT: 19.2 %
EST. GFR  (AFRICAN AMERICAN): >60 ML/MIN/1.73 M^2
EST. GFR  (NON AFRICAN AMERICAN): >60 ML/MIN/1.73 M^2
GIANT PLATELETS BLD QL SMEAR: PRESENT
GLUCOSE SERPL-MCNC: 166 MG/DL
HCT VFR BLD AUTO: 16.9 %
HGB BLD-MCNC: 5.2 G/DL
HYPOCHROMIA BLD QL SMEAR: ABNORMAL
IRON SERPL-MCNC: 49 UG/DL
LYMPHOCYTES # BLD AUTO: 0.6 K/UL
LYMPHOCYTES NFR BLD: 5.8 %
MAGNESIUM SERPL-MCNC: 1.8 MG/DL
MCH RBC QN AUTO: 24.6 PG
MCHC RBC AUTO-ENTMCNC: 30.8 G/DL
MCV RBC AUTO: 80 FL
MONOCYTES # BLD AUTO: 1 K/UL
MONOCYTES NFR BLD: 10.6 %
NEUTROPHILS # BLD AUTO: 8.1 K/UL
NEUTROPHILS NFR BLD: 83.7 %
NUM UNITS TRANS PACKED RBC: NORMAL
PLATELET # BLD AUTO: 100 K/UL
PLATELET BLD QL SMEAR: ABNORMAL
PMV BLD AUTO: 9.1 FL
POLYCHROMASIA BLD QL SMEAR: ABNORMAL
POTASSIUM SERPL-SCNC: 3.4 MMOL/L
PROT SERPL-MCNC: 7.7 G/DL
RBC # BLD AUTO: 2.11 M/UL
SATURATED IRON: 9 %
SODIUM SERPL-SCNC: 132 MMOL/L
STOMATOCYTES BLD QL SMEAR: PRESENT
TOTAL IRON BINDING CAPACITY: 555 UG/DL
TRANS ERYTHROCYTES VOL PATIENT: NORMAL ML
TRANSFERRIN SERPL-MCNC: 375 MG/DL
WBC # BLD AUTO: 9.79 K/UL

## 2017-12-30 PROCEDURE — 63600175 PHARM REV CODE 636 W HCPCS: Performed by: EMERGENCY MEDICINE

## 2017-12-30 PROCEDURE — 90471 IMMUNIZATION ADMIN: CPT | Performed by: EMERGENCY MEDICINE

## 2017-12-30 PROCEDURE — 96374 THER/PROPH/DIAG INJ IV PUSH: CPT

## 2017-12-30 PROCEDURE — 82746 ASSAY OF FOLIC ACID SERUM: CPT

## 2017-12-30 PROCEDURE — 83735 ASSAY OF MAGNESIUM: CPT

## 2017-12-30 PROCEDURE — 25000003 PHARM REV CODE 250: Performed by: EMERGENCY MEDICINE

## 2017-12-30 PROCEDURE — 36430 TRANSFUSION BLD/BLD COMPNT: CPT

## 2017-12-30 PROCEDURE — 99285 EMERGENCY DEPT VISIT HI MDM: CPT | Mod: 25

## 2017-12-30 PROCEDURE — 83540 ASSAY OF IRON: CPT

## 2017-12-30 PROCEDURE — 96376 TX/PRO/DX INJ SAME DRUG ADON: CPT

## 2017-12-30 PROCEDURE — 86901 BLOOD TYPING SEROLOGIC RH(D): CPT

## 2017-12-30 PROCEDURE — 85025 COMPLETE CBC W/AUTO DIFF WBC: CPT

## 2017-12-30 PROCEDURE — P9021 RED BLOOD CELLS UNIT: HCPCS

## 2017-12-30 PROCEDURE — 80053 COMPREHEN METABOLIC PANEL: CPT

## 2017-12-30 PROCEDURE — 3E0234Z INTRODUCTION OF SERUM, TOXOID AND VACCINE INTO MUSCLE, PERCUTANEOUS APPROACH: ICD-10-PCS | Performed by: EMERGENCY MEDICINE

## 2017-12-30 PROCEDURE — 86920 COMPATIBILITY TEST SPIN: CPT

## 2017-12-30 PROCEDURE — P9016 RBC LEUKOCYTES REDUCED: HCPCS

## 2017-12-30 PROCEDURE — 90715 TDAP VACCINE 7 YRS/> IM: CPT | Performed by: EMERGENCY MEDICINE

## 2017-12-30 PROCEDURE — 82607 VITAMIN B-12: CPT

## 2017-12-30 PROCEDURE — 96375 TX/PRO/DX INJ NEW DRUG ADDON: CPT

## 2017-12-30 PROCEDURE — 21400001 HC TELEMETRY ROOM

## 2017-12-30 RX ORDER — HYDROMORPHONE HYDROCHLORIDE 2 MG/ML
1 INJECTION, SOLUTION INTRAMUSCULAR; INTRAVENOUS; SUBCUTANEOUS
Status: DISCONTINUED | OUTPATIENT
Start: 2017-12-30 | End: 2017-12-31

## 2017-12-30 RX ORDER — HYDROCODONE BITARTRATE AND ACETAMINOPHEN 500; 5 MG/1; MG/1
TABLET ORAL
Status: DISCONTINUED | OUTPATIENT
Start: 2017-12-30 | End: 2018-01-02 | Stop reason: HOSPADM

## 2017-12-30 RX ORDER — ONDANSETRON 2 MG/ML
8 INJECTION INTRAMUSCULAR; INTRAVENOUS ONCE
Status: COMPLETED | OUTPATIENT
Start: 2017-12-30 | End: 2017-12-30

## 2017-12-30 RX ORDER — PANTOPRAZOLE SODIUM 40 MG/1
40 TABLET, DELAYED RELEASE ORAL 2 TIMES DAILY
Status: DISCONTINUED | OUTPATIENT
Start: 2017-12-30 | End: 2017-12-31 | Stop reason: DRUGHIGH

## 2017-12-30 RX ORDER — ONDANSETRON 2 MG/ML
4 INJECTION INTRAMUSCULAR; INTRAVENOUS EVERY 6 HOURS PRN
Status: DISCONTINUED | OUTPATIENT
Start: 2017-12-30 | End: 2017-12-31

## 2017-12-30 RX ORDER — HYDROMORPHONE HYDROCHLORIDE 2 MG/ML
1 INJECTION, SOLUTION INTRAMUSCULAR; INTRAVENOUS; SUBCUTANEOUS
Status: COMPLETED | OUTPATIENT
Start: 2017-12-30 | End: 2017-12-30

## 2017-12-30 RX ORDER — METOPROLOL TARTRATE 25 MG/1
25 TABLET, FILM COATED ORAL 2 TIMES DAILY
Status: DISCONTINUED | OUTPATIENT
Start: 2017-12-30 | End: 2018-01-02

## 2017-12-30 RX ADMIN — BACITRACIN, NEOMYCIN, POLYMYXIN B 1 EACH: 400; 3.5; 5 OINTMENT TOPICAL at 04:12

## 2017-12-30 RX ADMIN — ONDANSETRON 8 MG: 2 INJECTION INTRAMUSCULAR; INTRAVENOUS at 04:12

## 2017-12-30 RX ADMIN — CLOSTRIDIUM TETANI TOXOID ANTIGEN (FORMALDEHYDE INACTIVATED), CORYNEBACTERIUM DIPHTHERIAE TOXOID ANTIGEN (FORMALDEHYDE INACTIVATED), BORDETELLA PERTUSSIS TOXOID ANTIGEN (GLUTARALDEHYDE INACTIVATED), BORDETELLA PERTUSSIS FILAMENTOUS HEMAGGLUTININ ANTIGEN (FORMALDEHYDE INACTIVATED), BORDETELLA PERTUSSIS PERTACTIN ANTIGEN, AND BORDETELLA PERTUSSIS FIMBRIAE 2/3 ANTIGEN 0.5 ML: 5; 2; 2.5; 5; 3; 5 INJECTION, SUSPENSION INTRAMUSCULAR at 04:12

## 2017-12-30 RX ADMIN — HYDROMORPHONE HYDROCHLORIDE 1 MG: 2 INJECTION INTRAMUSCULAR; INTRAVENOUS; SUBCUTANEOUS at 04:12

## 2017-12-30 RX ADMIN — PANTOPRAZOLE SODIUM 40 MG: 40 TABLET, DELAYED RELEASE ORAL at 09:12

## 2017-12-30 RX ADMIN — METOPROLOL TARTRATE 25 MG: 25 TABLET, FILM COATED ORAL at 09:12

## 2017-12-30 RX ADMIN — HYDROMORPHONE HYDROCHLORIDE 1 MG: 2 INJECTION INTRAMUSCULAR; INTRAVENOUS; SUBCUTANEOUS at 06:12

## 2017-12-30 RX ADMIN — HYDROMORPHONE HYDROCHLORIDE 1 MG: 2 INJECTION INTRAMUSCULAR; INTRAVENOUS; SUBCUTANEOUS at 11:12

## 2017-12-30 NOTE — ED TRIAGE NOTES
Pt. Reports that he was sitting down and fell down and upper arms are hurting. Pt. Reports that he has no history of seizures, Pt. Reports the last thing he remembers was talking to a gentleman before the fall and doesn't remember anything after that. Pt. Reports he has a history of hypertension, wife reports pt. Had some vomiting coming from his mouth. Friend reports that pt. did hit his head on the while prior to hitting the floor, friend states  Pt. Is AAOx3, calm but reporting pain in the shoulders 10/10, wife at the bedside.

## 2017-12-30 NOTE — ED PROVIDER NOTES
"Encounter Date: 12/30/2017    SCRIBE #1 NOTE: I, Yeni Lyon, am scribing for, and in the presence of,  Manoj Bassett MD. I have scribed the following portions of the note - Other sections scribed: HPI and ROS.       History     Chief Complaint   Patient presents with    Seizures     pt presents post ictal; pt does not remember seizure, and denies hx of seizures; family reported to EMS seizure was approx 1 minute     Chief Complaint: Seizures    HPI: This 37 y.o. Male with HTN and thyroid disease presents to the ED status post a seizures episode today. Episode was witnessed by neighbors. Neighbor states the patient came out of his apartment and sat on the curb joining them in conversation. Neighbor reports asking him if he was feeling well because he did not look very good and appeared swollen in the face. Patient responded "I am fine".  Neighbor states everyone began to stand, when the patient began to fall over, hitting head on a brick wall. Once patient fell to ground he began to shake and jerk. Jerking episode lasted approximately 2 minutes well entire episode last approximately before the patient came to. Patient has no hx of seizures. At this time, he c/o bilateral arm and shoulder pain, right worse than left. Pain radiates from shoulder to arm. Right arm pain is exacerbated with positional movements. Patient does not have any memory of event. At this time, he denies fever, chills, headaches, ear pain, sore throat, cough, chest pain, abdominal pain, or dysuria.       The history is provided by the patient and a friend. A  was used.     Review of patient's allergies indicates:  No Known Allergies  Past Medical History:   Diagnosis Date    Hypertension     Thyroid disease      History reviewed. No pertinent surgical history.  History reviewed. No pertinent family history.  Social History   Substance Use Topics    Smoking status: Never Smoker    Smokeless tobacco: Never Used    " Alcohol use 1.2 - 1.8 oz/week     2 - 3 Cans of beer per week      Comment: per day     Review of Systems   Constitutional: Negative for chills and fever.   HENT: Negative for ear pain and sore throat.    Eyes: Negative for pain.   Respiratory: Negative for cough and shortness of breath.    Cardiovascular: Negative for chest pain.   Gastrointestinal: Negative for abdominal pain, diarrhea, nausea and vomiting.   Genitourinary: Negative for dysuria.   Musculoskeletal: Negative for myalgias (arm or leg pain).        (+) arm pain  (+) shoulder pain   Skin: Negative for rash.   Neurological: Positive for seizures. Negative for headaches.       Physical Exam     Initial Vitals [12/30/17 1310]   BP Pulse Resp Temp SpO2   (!) 164/87 (!) 118 20 99.1 °F (37.3 °C) 99 %      MAP       112.67         Physical Exam  The patient was examined specifically for the following:   General:No significant distress, Good color, Warm and dry. Head and neck:Scalp atraumatic, Neck supple. Neurological:Appropriate conversation, Gross motor deficits. Eyes:Conjugate gaze, Clear corneas. ENT: No epistaxis. Cardiac: Regular rate and rhythm, Grossly normal heart tones. Pulmonary: Wheezing, Rales. Gastrointestinal: Abdominal tenderness, Abdominal distention. Musculoskeletal: Extremity deformity, Apparent pain with range of motion of the joints. Skin: Rash.   The findings on examination were normal except for the following: The patient has tenderness and pale range of motion of the right shoulder and right humeral arm.  There is a small abrasion about the right ulnar dorsal wrist.  There is no tenderness or pain range of motion of the right wrist.  There is no tenderness or pain range of motion of the right elbow.  There is tenderness and pain with range of motion of the right shoulder.  Neurovascular and tendon function are intact distally.  The lungs are clear.  The heart tones are normal.  Mental status examination, cranial nerves, motor and  sensory examination are normal.  The scalp is atraumatic.  Chest abdomen and pelvis are nontender there is no spinal tenderness.  There is no other extremity tenderness.  There are bite marks on the right lateral aspect of the tongue.  ED Course   Procedures  Labs Reviewed   COMPREHENSIVE METABOLIC PANEL - Abnormal; Notable for the following:        Result Value    Sodium 132 (*)     Potassium 3.4 (*)     CO2 21 (*)     Glucose 166 (*)     Calcium 8.6 (*)     Albumin 3.2 (*)     Alkaline Phosphatase 140 (*)      (*)     All other components within normal limits   CBC W/ AUTO DIFFERENTIAL - Abnormal; Notable for the following:     RBC 2.11 (*)     Hemoglobin 5.2 (*)     Hematocrit 16.9 (*)     MCV 80 (*)     MCH 24.6 (*)     MCHC 30.8 (*)     RDW 19.2 (*)     Platelets 100 (*)     MPV 9.1 (*)     All other components within normal limits    Narrative:       HGB/ HTC critical result(s) called and verbal readback obtained   from Karen Peacock at 17:35 on 12/30/2017, 12/30/2017 17:40  Recoll. [id number] by CHIKIS at 12/30/2017 16:56, reason: to verify   results per Dr. Bassett.  Please collect BB specimen too.   MAGNESIUM          X-Rays:   Independently Interpreted Readings:   Other Readings:  CT of the head revealed a density in the internal capsule that is suspicious for a lacunar infarct.  X-rays of the right shoulder failed to reveal evidence of fracture.        Medical decision making: This patient presents with a seizure.  He has never had a seizure before.  The CT the head is abnormal for a hypokalemia density at the internal capsule.  The patient will be admitted to have an MRI, and a neurology consult.  The patient was also discovered to be extremely anemic with a hemoglobin of 5.2 and hematocrit of 16.9.  I'm concerned that his anemia could've contributed to his seizure.  Stool is guaiac positive.  The patient will be admitted, transfused and GI will be consulted.  The patient has severe pain in his right  shoulder I did not identify a fracture dislocation.  I will treat him for pain.  I will start him on proton pump inhibitors.  I discussed this case with Dr. Rangel and I will write orders on behalf of [facility] medicine.          Scribe Attestation:   Scribe #1: I performed the above scribed service and the documentation accurately describes the services I performed. I attest to the accuracy of the note.    Attending Attestation:           Physician Attestation for Scribe:  Physician Attestation Statement for Scribe #1: I, Manoj Bassett MD, reviewed documentation, as scribed by Yeni Lyon in my presence, and it is both accurate and complete.                 ED Course      Clinical Impression:   The primary encounter diagnosis was Severe anemia. Diagnoses of Seizure and Right shoulder pain were also pertinent to this visit.                           Manoj Bassett MD  12/30/17 1835       Manoj Bassett MD  12/30/17 1833

## 2017-12-31 PROBLEM — R79.1 ELEVATED INR: Status: RESOLVED | Noted: 2017-08-18 | Resolved: 2017-12-31

## 2017-12-31 PROBLEM — E88.09 HYPOALBUMINEMIA: Status: RESOLVED | Noted: 2017-08-18 | Resolved: 2017-12-31

## 2017-12-31 PROBLEM — K92.2 GASTROINTESTINAL HEMORRHAGE: Status: RESOLVED | Noted: 2017-09-07 | Resolved: 2017-12-31

## 2017-12-31 PROBLEM — K92.1 MELENA: Status: ACTIVE | Noted: 2017-12-31

## 2017-12-31 PROBLEM — I85.00 ESOPHAGEAL VARICES WITHOUT BLEEDING: Chronic | Status: ACTIVE | Noted: 2017-12-31

## 2017-12-31 PROBLEM — R55 SYNCOPE: Status: ACTIVE | Noted: 2017-12-31

## 2017-12-31 PROBLEM — D62 ACUTE BLOOD LOSS ANEMIA: Status: RESOLVED | Noted: 2017-09-07 | Resolved: 2017-12-31

## 2017-12-31 PROBLEM — I10 ESSENTIAL HYPERTENSION: Chronic | Status: ACTIVE | Noted: 2017-12-31

## 2017-12-31 PROBLEM — K74.60 CIRRHOSIS OF LIVER: Chronic | Status: ACTIVE | Noted: 2017-12-31

## 2017-12-31 PROBLEM — D63.8 ANEMIA OF CHRONIC DISEASE: Chronic | Status: ACTIVE | Noted: 2017-12-31

## 2017-12-31 PROBLEM — I86.4 BLEEDING GASTRIC VARICES: Status: RESOLVED | Noted: 2017-08-18 | Resolved: 2017-12-31

## 2017-12-31 PROBLEM — D63.8 ANEMIA OF CHRONIC DISEASE: Status: ACTIVE | Noted: 2017-12-31

## 2017-12-31 PROBLEM — F10.10 ALCOHOL ABUSE: Chronic | Status: ACTIVE | Noted: 2017-08-18

## 2017-12-31 PROBLEM — R74.01 ELEVATED AST (SGOT): Status: RESOLVED | Noted: 2017-08-18 | Resolved: 2017-12-31

## 2017-12-31 LAB
ALBUMIN SERPL BCP-MCNC: 3.2 G/DL
ALP SERPL-CCNC: 134 U/L
ALT SERPL W/O P-5'-P-CCNC: 35 U/L
ANION GAP SERPL CALC-SCNC: 9 MMOL/L
AST SERPL-CCNC: 100 U/L
BASOPHILS # BLD AUTO: 0.03 K/UL
BASOPHILS NFR BLD: 0.3 %
BILIRUB SERPL-MCNC: 1.7 MG/DL
BUN SERPL-MCNC: 8 MG/DL
CALCIUM SERPL-MCNC: 8.3 MG/DL
CHLORIDE SERPL-SCNC: 98 MMOL/L
CO2 SERPL-SCNC: 25 MMOL/L
CREAT SERPL-MCNC: 0.8 MG/DL
DIFFERENTIAL METHOD: ABNORMAL
EOSINOPHIL # BLD AUTO: 0 K/UL
EOSINOPHIL NFR BLD: 0.3 %
ERYTHROCYTE [DISTWIDTH] IN BLOOD BY AUTOMATED COUNT: 18.2 %
EST. GFR  (AFRICAN AMERICAN): >60 ML/MIN/1.73 M^2
EST. GFR  (NON AFRICAN AMERICAN): >60 ML/MIN/1.73 M^2
FOLATE SERPL-MCNC: 6.3 NG/ML
GLUCOSE SERPL-MCNC: 133 MG/DL
HCT VFR BLD AUTO: 23.1 %
HCT VFR BLD AUTO: 23.2 %
HGB BLD-MCNC: 7.4 G/DL
HGB BLD-MCNC: 7.4 G/DL
LYMPHOCYTES # BLD AUTO: 0.8 K/UL
LYMPHOCYTES NFR BLD: 7.4 %
MAGNESIUM SERPL-MCNC: 1.7 MG/DL
MCH RBC QN AUTO: 26.9 PG
MCHC RBC AUTO-ENTMCNC: 31.9 G/DL
MCV RBC AUTO: 84 FL
MONOCYTES # BLD AUTO: 1.3 K/UL
MONOCYTES NFR BLD: 11.2 %
NEUTROPHILS # BLD AUTO: 9 K/UL
NEUTROPHILS NFR BLD: 80.3 %
PHOSPHATE SERPL-MCNC: 2.4 MG/DL
PLATELET # BLD AUTO: 117 K/UL
PMV BLD AUTO: 10.1 FL
POTASSIUM SERPL-SCNC: 3.2 MMOL/L
PROT SERPL-MCNC: 7.9 G/DL
RBC # BLD AUTO: 2.75 M/UL
SODIUM SERPL-SCNC: 132 MMOL/L
VIT B12 SERPL-MCNC: 526 PG/ML
WBC # BLD AUTO: 11.15 K/UL

## 2017-12-31 PROCEDURE — 63600175 PHARM REV CODE 636 W HCPCS: Performed by: INTERNAL MEDICINE

## 2017-12-31 PROCEDURE — 99232 SBSQ HOSP IP/OBS MODERATE 35: CPT | Mod: ,,, | Performed by: PSYCHIATRY & NEUROLOGY

## 2017-12-31 PROCEDURE — 36430 TRANSFUSION BLD/BLD COMPNT: CPT

## 2017-12-31 PROCEDURE — 36415 COLL VENOUS BLD VENIPUNCTURE: CPT

## 2017-12-31 PROCEDURE — C9113 INJ PANTOPRAZOLE SODIUM, VIA: HCPCS | Performed by: INTERNAL MEDICINE

## 2017-12-31 PROCEDURE — 85014 HEMATOCRIT: CPT

## 2017-12-31 PROCEDURE — 21400001 HC TELEMETRY ROOM

## 2017-12-31 PROCEDURE — 25000003 PHARM REV CODE 250: Performed by: EMERGENCY MEDICINE

## 2017-12-31 PROCEDURE — 85025 COMPLETE CBC W/AUTO DIFF WBC: CPT

## 2017-12-31 PROCEDURE — 83735 ASSAY OF MAGNESIUM: CPT

## 2017-12-31 PROCEDURE — 84100 ASSAY OF PHOSPHORUS: CPT

## 2017-12-31 PROCEDURE — 25000003 PHARM REV CODE 250: Performed by: INTERNAL MEDICINE

## 2017-12-31 PROCEDURE — 80053 COMPREHEN METABOLIC PANEL: CPT

## 2017-12-31 PROCEDURE — 85018 HEMOGLOBIN: CPT

## 2017-12-31 PROCEDURE — 63600175 PHARM REV CODE 636 W HCPCS: Performed by: EMERGENCY MEDICINE

## 2017-12-31 RX ORDER — METOCLOPRAMIDE HYDROCHLORIDE 5 MG/ML
10 INJECTION INTRAMUSCULAR; INTRAVENOUS EVERY 6 HOURS
Status: COMPLETED | OUTPATIENT
Start: 2017-12-31 | End: 2018-01-01

## 2017-12-31 RX ORDER — LORAZEPAM 2 MG/ML
1 INJECTION INTRAMUSCULAR EVERY 4 HOURS PRN
Status: DISCONTINUED | OUTPATIENT
Start: 2017-12-31 | End: 2018-01-02 | Stop reason: HOSPADM

## 2017-12-31 RX ORDER — FOLIC ACID 1 MG/1
1 TABLET ORAL DAILY
Status: DISCONTINUED | OUTPATIENT
Start: 2017-12-31 | End: 2018-01-02 | Stop reason: HOSPADM

## 2017-12-31 RX ORDER — FUROSEMIDE 40 MG/1
40 TABLET ORAL DAILY
Status: DISCONTINUED | OUTPATIENT
Start: 2017-12-31 | End: 2018-01-02 | Stop reason: HOSPADM

## 2017-12-31 RX ORDER — PROCHLORPERAZINE EDISYLATE 5 MG/ML
5 INJECTION INTRAMUSCULAR; INTRAVENOUS EVERY 6 HOURS PRN
Status: DISCONTINUED | OUTPATIENT
Start: 2017-12-31 | End: 2018-01-02 | Stop reason: HOSPADM

## 2017-12-31 RX ORDER — THIAMINE HCL 100 MG
100 TABLET ORAL DAILY
Status: DISCONTINUED | OUTPATIENT
Start: 2017-12-31 | End: 2018-01-02 | Stop reason: HOSPADM

## 2017-12-31 RX ORDER — ACETAMINOPHEN 500 MG
500 TABLET ORAL EVERY 6 HOURS PRN
Status: DISCONTINUED | OUTPATIENT
Start: 2017-12-31 | End: 2018-01-02 | Stop reason: HOSPADM

## 2017-12-31 RX ORDER — RAMELTEON 8 MG/1
8 TABLET ORAL NIGHTLY PRN
Status: DISCONTINUED | OUTPATIENT
Start: 2017-12-31 | End: 2018-01-02 | Stop reason: HOSPADM

## 2017-12-31 RX ORDER — POTASSIUM CHLORIDE 20 MEQ/15ML
40 SOLUTION ORAL
Status: DISCONTINUED | OUTPATIENT
Start: 2017-12-31 | End: 2018-01-02 | Stop reason: HOSPADM

## 2017-12-31 RX ORDER — ONDANSETRON 2 MG/ML
8 INJECTION INTRAMUSCULAR; INTRAVENOUS EVERY 8 HOURS PRN
Status: DISCONTINUED | OUTPATIENT
Start: 2017-12-31 | End: 2018-01-02 | Stop reason: HOSPADM

## 2017-12-31 RX ORDER — SPIRONOLACTONE 25 MG/1
25 TABLET ORAL DAILY
Status: DISCONTINUED | OUTPATIENT
Start: 2017-12-31 | End: 2018-01-02 | Stop reason: HOSPADM

## 2017-12-31 RX ORDER — CLONIDINE HYDROCHLORIDE 0.1 MG/1
0.1 TABLET ORAL 3 TIMES DAILY PRN
Status: DISCONTINUED | OUTPATIENT
Start: 2017-12-31 | End: 2018-01-02 | Stop reason: HOSPADM

## 2017-12-31 RX ADMIN — FOLIC ACID 1 MG: 1 TABLET ORAL at 08:12

## 2017-12-31 RX ADMIN — CLONIDINE HYDROCHLORIDE 0.1 MG: 0.1 TABLET ORAL at 11:12

## 2017-12-31 RX ADMIN — FUROSEMIDE 40 MG: 40 TABLET ORAL at 08:12

## 2017-12-31 RX ADMIN — Medication 5 ML: at 08:12

## 2017-12-31 RX ADMIN — CLONIDINE HYDROCHLORIDE 0.1 MG: 0.1 TABLET ORAL at 12:12

## 2017-12-31 RX ADMIN — ACETAMINOPHEN 500 MG: 500 TABLET ORAL at 03:12

## 2017-12-31 RX ADMIN — CEFTRIAXONE 1 G: 1 INJECTION, SOLUTION INTRAVENOUS at 08:12

## 2017-12-31 RX ADMIN — METOPROLOL TARTRATE 25 MG: 25 TABLET, FILM COATED ORAL at 08:12

## 2017-12-31 RX ADMIN — METOCLOPRAMIDE 10 MG: 5 INJECTION, SOLUTION INTRAMUSCULAR; INTRAVENOUS at 03:12

## 2017-12-31 RX ADMIN — METOCLOPRAMIDE 10 MG: 5 INJECTION, SOLUTION INTRAMUSCULAR; INTRAVENOUS at 11:12

## 2017-12-31 RX ADMIN — HYDROMORPHONE HYDROCHLORIDE 1 MG: 2 INJECTION INTRAMUSCULAR; INTRAVENOUS; SUBCUTANEOUS at 12:12

## 2017-12-31 RX ADMIN — METOCLOPRAMIDE 10 MG: 5 INJECTION, SOLUTION INTRAMUSCULAR; INTRAVENOUS at 07:12

## 2017-12-31 RX ADMIN — DEXTROSE 8 MG/HR: 50 INJECTION, SOLUTION INTRAVENOUS at 10:12

## 2017-12-31 RX ADMIN — SPIRONOLACTONE 25 MG: 25 TABLET ORAL at 08:12

## 2017-12-31 RX ADMIN — ACETAMINOPHEN 500 MG: 500 TABLET ORAL at 08:12

## 2017-12-31 RX ADMIN — DEXTROSE 8 MG/HR: 50 INJECTION, SOLUTION INTRAVENOUS at 09:12

## 2017-12-31 RX ADMIN — OCTREOTIDE ACETATE 50 MCG/HR: 500 INJECTION, SOLUTION INTRAVENOUS; SUBCUTANEOUS at 08:12

## 2017-12-31 RX ADMIN — DEXTROSE 8 MG/HR: 50 INJECTION, SOLUTION INTRAVENOUS at 08:12

## 2017-12-31 RX ADMIN — Medication 100 MG: at 08:12

## 2017-12-31 NOTE — HPI
Mr. Alcides Mac is a 37 y.o. male with essential hypertension, cirrhosis of liver, esophageal varices, anemia of chronic disease, and alcohol abuse who presents to [facility] ED with complaints of syncope today.  He had gone out to speak with some of his neighbors when he stood up and lost consciousness.  There were some seizure-like activity per witnesses, and he was confused when he arrived at the ED.  He denies remembering any of these events and had no antecedent chest pain, shortness of breath, palpitations, nausea, vomiting, nor any diaphoresis.  He was found to be very anemic in the ED and does report melena for the past week.  He doesn't have any abdominal pain and doesn't use NSAIDs.  He does have esophageal varies but denies any hematemesis nor coffee-ground emesis.  He admits to drinking alcohol with his last drink being a 16-oz beer yesterday.

## 2017-12-31 NOTE — ED NOTES
Report received from Samira HAMM. Pt asleep in stretcher, in no acute distress. VSS. Will continue to monitor.

## 2017-12-31 NOTE — ASSESSMENT & PLAN NOTE
Patient's blood pressure is poorly-controlled; will continue home regimen of furosemide, metoprolol, and spironolactone, and provide as-needed clonidine.

## 2017-12-31 NOTE — CONSULTS
[facility]  Neurology  Consult Note    Patient Name: Alcides Mac  MRN: [id number]  Admission Date: 12/30/2017  Hospital Length of Stay: 1 days  Code Status: Full Code   Attending Provider: Ziyad Rangel MD   Consulting Provider: Trung White MD  Primary Care Physician: Primary Doctor No  Principal Problem:Symptomatic anemia    Consults  Subjective:     Chief Complaint: Seizure    HPI: 38 y/o male with medical Hx as listed below comes to ED after a reported seizure. A friend told him he had a seizure while talking to him. Mr. Mac recalls waking up on the ground with paramedics helping him. No previous episodes. He denies any aggravating or alleviating factors. Pt was admitted as  was found to be markedly anemic. No weakness, numbness, vertigo, visual or speech disturbances.     Hx of heavy EtOH consumption for 12 years (at least one bottle of liquor).    Past Medical History:   Diagnosis Date    Hypertension     Thyroid disease        History reviewed. No pertinent surgical history.    Review of patient's allergies indicates:  No Known Allergies    Current Neurological Medications:     No current facility-administered medications on file prior to encounter.      Current Outpatient Prescriptions on File Prior to Encounter   Medication Sig    furosemide (LASIX) 40 MG tablet Take 1 tablet (40 mg total) by mouth once daily.    metoprolol tartrate (LOPRESSOR) 25 MG tablet Take 1 tablet (25 mg total) by mouth 2 (two) times daily.    spironolactone (ALDACTONE) 25 MG tablet Take 1 tablet (25 mg total) by mouth once daily.      Family History     None        Social History Main Topics    Smoking status: Never Smoker    Smokeless tobacco: Never Used    Alcohol use 0.6 oz/week     1 Cans of beer per week      Comment: occasional    Drug use: No    Sexual activity: Yes     Review of Systems   Constitutional: Negative for fever and unexpected weight change.   HENT: Negative for trouble  swallowing and voice change.    Eyes: Negative for photophobia and visual disturbance.   Respiratory: Negative for shortness of breath.    Cardiovascular: Negative for chest pain and palpitations.   Gastrointestinal: Negative for abdominal pain.   Genitourinary: Negative for dysuria.   Musculoskeletal: Negative for back pain.   Neurological:  Negative for dizziness, tremors, seizures, syncope, speech difficulty, light-headedness or headaches.  Psychiatric/Behavioral: Negative for confusion and hallucinations.        Objective:     Vital Signs (Most Recent):  Temp: 99.8 °F (37.7 °C) (12/31/17 0739)  Pulse: 93 (12/31/17 0739)  Resp: 20 (12/31/17 0739)  BP: (!) 144/90 (12/31/17 0739)  SpO2: 99 % (12/31/17 0739) Vital Signs (24h Range):  Temp:  [98.6 °F (37 °C)-100.1 °F (37.8 °C)] 99.8 °F (37.7 °C)  Pulse:  [] 93  Resp:  [20-22] 20  SpO2:  [95 %-100 %] 99 %  BP: (144-185)/() 144/90     Weight: 94.8 kg (208 lb 15.9 oz)  Body mass index is 29.99 kg/m².    Physical Exam  Constitutional: Oriented to person, place, and time. Appears well-developed and well-nourished.   HENT:   Head: Normocephalic and atraumatic.   Eyes: EOM are normal. Pupils are equal, round, and reactive to light. Right eye exhibits no discharge. Left eye exhibits no discharge. No scleral icterus.   Neck: Normal range of motion. Neck supple.   Cardiovascular: Normal rate.    Pulmonary/Chest: Breath sounds normal.   Abdominal: Soft. Bowel sounds are normal.   Musculoskeletal: Normal range of motion.   Neurological: Alert        NEUROLOGICAL EXAMINATION:      MENTAL STATUS   Oriented to person, place, and time.   Level of consciousness: alert  No dysarhtria        CRANIAL NERVES      CN II   Visual fields full to confrontation.      CN III, IV, VI   Pupils are equal, round, and reactive to light.  Extraocular motions are normal.   Right pupil: Size: 3 mm.   Left pupil: Size: 3 mm.      CN VII   No facial weakness     CN IX, X   CN IX normal.       CN XI   Right sternocleidomastoid strength: normal  Left sternocleidomastoid strength: normal     CN XII   Tongue deviation: none     MOTOR EXAM   No drift  5/5 on right UE; 5/5 on left side    No coordination difficulties          Significant Labs:   CBC:   Recent Labs  Lab 12/30/17  1718 12/31/17  0701 12/31/17  0914   WBC 9.79 11.15  --    HGB 5.2* 7.4* 7.4*   HCT 16.9* 23.2* 23.1*   * 117*  --      CMP:   Recent Labs  Lab 12/30/17  1610 12/31/17  0701   * 133*   * 132*   K 3.4* 3.2*    98   CO2 21* 25   BUN 11 8   CREATININE 0.8 0.8   CALCIUM 8.6* 8.3*   MG 1.8 1.7   PROT 7.7 7.9   ALBUMIN 3.2* 3.2*   BILITOT 1.0 1.7*   ALKPHOS 140* 134   * 100*   ALT 34 35   ANIONGAP 10 9   EGFRNONAA >60 >60       Significant Imaging:   MRI brain  1.  No acute intracranial process.    2.  Multiple subcentimeter T2/FLAIR signal abnormalities in the basal ganglia, and subcortical white matter, and periventricular white matter.  Leading differential consideration is vasculitis.  Autoimmune process can have a similar appearance.  Suggest clinical correlation and followup (preferably with contrast enhanced examination).              Electronically signed by: MACRINA ALCARAZ MD  Date: 12/30/17  Time: 21:18              Assessment and Plan:     38 y/o male consulted for seizure    1. Seizure: this could have been a convulsive syncope as pt was markedly anemic.  Watch for signs of EtOH withdrawal.   MRI brain shows no definite etiology for seizures.   -Will monitor for now.    Active Diagnoses:    Diagnosis Date Noted POA    PRINCIPAL PROBLEM:  Symptomatic anemia [D64.9] 12/30/2017 Yes    Syncope [R55] 12/31/2017 Yes    Essential hypertension [I10] 12/31/2017 Yes     Chronic    Cirrhosis of liver [K74.60] 12/31/2017 Yes     Chronic    Esophageal varices without bleeding [I85.00] 12/31/2017 Yes     Chronic    Anemia of chronic disease [D63.8] 12/31/2017 Yes     Chronic    Melena [K92.1]  12/31/2017 Yes    Alcohol abuse [F10.10] 08/18/2017 Yes     Chronic      Problems Resolved During this Admission:    Diagnosis Date Noted Date Resolved POA       VTE Risk Mitigation         Ordered     Medium Risk of VTE  Once      12/31/17 0030     Place sequential compression device  Until discontinued      12/31/17 0030     Place PATRICIA hose  Until discontinued      12/31/17 0030          Thank you for your consult. I will follow-up with patient. Please contact us if you have any additional questions.    Trung White MD  Neurology  [facility]

## 2017-12-31 NOTE — PLAN OF CARE
Problem: Fall Risk (Adult)  Goal: Identify Related Risk Factors and Signs and Symptoms  Related risk factors and signs and symptoms are identified upon initiation of Human Response Clinical Practice Guideline (CPG)   Outcome: Ongoing (interventions implemented as appropriate)   12/31/17 0614   Fall Risk   Related Risk Factors (Fall Risk) culprit medication(s);confusion/agitation;fatigue/slow reaction;history of falls;inadequate lighting;objects hard to reach;polypharmacy;sleep pattern alteration;slipper/uneven surfaces;environment unfamiliar   Signs and Symptoms (Fall Risk) presence of risk factors

## 2017-12-31 NOTE — ASSESSMENT & PLAN NOTE
I'm not sure if his history if consistent with a seizure, though he could've had a convulsive syncope.  He does abuse alcohol but he said his last alcoholic drink was yesterday, which does not put him in the timeline of a withdrawal seizure.  Will continue to monitor.

## 2017-12-31 NOTE — ASSESSMENT & PLAN NOTE
Patient presented with syncope and worsening anemia today.  His hemoglobin today is 5.2 grams which is decreased from three months ago at 8.7 grams.  He does have a history of esophageal bleeding for which he had to undergo banding and pRBC transfusions, but he has no evidence of esophageal bleeding now.  He is otherwise hemodynamically-stable.  Will start pRBC transfusion and will be careful not to transfuse above a hemoglobin of 8 grams.  Will consult Gastroenterology for further recommendations.

## 2017-12-31 NOTE — SUBJECTIVE & OBJECTIVE
Past Medical History:   Diagnosis Date    Hypertension     Thyroid disease        History reviewed. No pertinent surgical history.    Review of patient's allergies indicates:  No Known Allergies    No current facility-administered medications on file prior to encounter.      Current Outpatient Prescriptions on File Prior to Encounter   Medication Sig    furosemide (LASIX) 40 MG tablet Take 1 tablet (40 mg total) by mouth once daily.    metoprolol tartrate (LOPRESSOR) 25 MG tablet Take 1 tablet (25 mg total) by mouth 2 (two) times daily.    spironolactone (ALDACTONE) 25 MG tablet Take 1 tablet (25 mg total) by mouth once daily.     Family History     None        Social History Main Topics    Smoking status: Never Smoker    Smokeless tobacco: Never Used    Alcohol use 0.6 oz/week     1 Cans of beer per week      Comment: occasional    Drug use: No    Sexual activity: Yes     Review of Systems   Constitutional: Negative for activity change, appetite change, chills, diaphoresis, fatigue, fever and unexpected weight change.   HENT: Negative.    Eyes: Negative.    Respiratory: Negative for cough, chest tightness, shortness of breath and wheezing.    Cardiovascular: Negative for chest pain, palpitations and leg swelling.   Gastrointestinal: Positive for blood in stool (melena). Negative for abdominal distention, abdominal pain, constipation, diarrhea, nausea and vomiting.   Genitourinary: Negative for dysuria and hematuria.   Musculoskeletal: Negative.    Skin: Negative.    Neurological: Positive for syncope. Negative for dizziness, seizures, weakness and light-headedness.   Psychiatric/Behavioral: Negative.      Objective:     Vital Signs (Most Recent):  Temp: 98.9 °F (37.2 °C) (12/31/17 0059)  Pulse: (!) 112 (12/31/17 0059)  Resp: 20 (12/31/17 0059)  BP: (!) 174/90 (12/31/17 0059)  SpO2: 98 % (12/31/17 0059) Vital Signs (24h Range):  Temp:  [98.6 °F (37 °C)-100.1 °F (37.8 °C)] 98.9 °F (37.2 °C)  Pulse:   [] 112  Resp:  [20-22] 20  SpO2:  [96 %-100 %] 98 %  BP: (162-185)/() 174/90     Weight: 95.3 kg (210 lb)  Body mass index is 30.13 kg/m².    Physical Exam   Constitutional: He is oriented to person, place, and time. He appears well-developed and well-nourished. No distress.   HENT:   Head: Normocephalic and atraumatic.   Right Ear: External ear normal.   Left Ear: External ear normal.   Nose: Nose normal.   Eyes: Right eye exhibits no discharge. Left eye exhibits no discharge.   Neck: Normal range of motion.   Cardiovascular: Normal rate, regular rhythm, normal heart sounds and intact distal pulses.  Exam reveals no gallop and no friction rub.    No murmur heard.  Pulmonary/Chest: Effort normal and breath sounds normal. No respiratory distress. He has no wheezes. He has no rales. He exhibits no tenderness.   Abdominal: Soft. Bowel sounds are normal. He exhibits no distension. There is no tenderness. There is no rebound and no guarding.   Musculoskeletal: Normal range of motion. He exhibits no edema.   Neurological: He is alert and oriented to person, place, and time.   Skin: Skin is warm and dry. He is not diaphoretic. No erythema.   Psychiatric: He has a normal mood and affect. His behavior is normal. Judgment and thought content normal.   Nursing note and vitals reviewed.          Significant Labs: All pertinent labs within the past 24 hours have been reviewed.    Significant Imaging: I have reviewed and interpreted all pertinent imaging results/findings within the past 24 hours.

## 2017-12-31 NOTE — ASSESSMENT & PLAN NOTE
Patient does report a week-long history of melena without recent use of NSAIDs.  He does have esophageal varies; will consult Gastroenterology for further recommendations.

## 2017-12-31 NOTE — H&P
[facility] Medicine  History & Physical    Patient Name: Alcides Mac  MRN: [id number]  Admission Date: 12/30/2017  Attending Physician: Ziyad Rangel MD   Primary Care Provider: Primary Doctor No         Patient information was obtained from patient.     Subjective:     Principal Problem:Symptomatic anemia    Chief Complaint: Loss of consciousness today.    HPI: Mr. Alcides Mac is a 37 y.o. male with essential hypertension, cirrhosis of liver, esophageal varices, anemia of chronic disease, and alcohol abuse who presents to [facility] ED with complaints of syncope today.  He had gone out to speak with some of his neighbors when he stood up and lost consciousness.  There were some seizure-like activity per witnesses, and he was confused when he arrived at the ED.  He denies remembering any of these events and had no antecedent chest pain, shortness of breath, palpitations, nausea, vomiting, nor any diaphoresis.  He was found to be very anemic in the ED and does report melena for the past week.  He doesn't have any abdominal pain and doesn't use NSAIDs.  He does have esophageal varies but denies any hematemesis nor coffee-ground emesis.  He admits to drinking alcohol with his last drink being a 16-oz beer yesterday.    Chart Review:  Previous Hospitalizations  Date Hospital Diagnosis   Sept 2017 [facility] Variceal bleeding s/p banding; pRBC 4 units; paracentesis 2.5 L   Aug 2017 [facility] Variceal bleeding s/p EGD & banding; pRBC transfusion      Past Medical History:   Diagnosis Date    Hypertension     Thyroid disease        History reviewed. No pertinent surgical history.    Review of patient's allergies indicates:  No Known Allergies    No current facility-administered medications on file prior to encounter.      Current Outpatient Prescriptions on File Prior to Encounter   Medication Sig    furosemide (LASIX) 40 MG tablet Take 1 tablet (40 mg total) by mouth once daily.     metoprolol tartrate (LOPRESSOR) 25 MG tablet Take 1 tablet (25 mg total) by mouth 2 (two) times daily.    spironolactone (ALDACTONE) 25 MG tablet Take 1 tablet (25 mg total) by mouth once daily.     Family History     None        Social History Main Topics    Smoking status: Never Smoker    Smokeless tobacco: Never Used    Alcohol use 0.6 oz/week     1 Cans of beer per week      Comment: occasional    Drug use: No    Sexual activity: Yes     Review of Systems   Constitutional: Negative for activity change, appetite change, chills, diaphoresis, fatigue, fever and unexpected weight change.   HENT: Negative.    Eyes: Negative.    Respiratory: Negative for cough, chest tightness, shortness of breath and wheezing.    Cardiovascular: Negative for chest pain, palpitations and leg swelling.   Gastrointestinal: Positive for blood in stool (melena). Negative for abdominal distention, abdominal pain, constipation, diarrhea, nausea and vomiting.   Genitourinary: Negative for dysuria and hematuria.   Musculoskeletal: Negative.    Skin: Negative.    Neurological: Positive for syncope. Negative for dizziness, seizures, weakness and light-headedness.   Psychiatric/Behavioral: Negative.      Objective:     Vital Signs (Most Recent):  Temp: 98.9 °F (37.2 °C) (12/31/17 0059)  Pulse: (!) 112 (12/31/17 0059)  Resp: 20 (12/31/17 0059)  BP: (!) 174/90 (12/31/17 0059)  SpO2: 98 % (12/31/17 0059) Vital Signs (24h Range):  Temp:  [98.6 °F (37 °C)-100.1 °F (37.8 °C)] 98.9 °F (37.2 °C)  Pulse:  [] 112  Resp:  [20-22] 20  SpO2:  [96 %-100 %] 98 %  BP: (162-185)/() 174/90     Weight: 95.3 kg (210 lb)  Body mass index is 30.13 kg/m².    Physical Exam   Constitutional: He is oriented to person, place, and time. He appears well-developed and well-nourished. No distress.   HENT:   Head: Normocephalic and atraumatic.   Right Ear: External ear normal.   Left Ear: External ear normal.   Nose: Nose normal.   Eyes: Right eye exhibits no  discharge. Left eye exhibits no discharge.   Neck: Normal range of motion.   Cardiovascular: Normal rate, regular rhythm, normal heart sounds and intact distal pulses.  Exam reveals no gallop and no friction rub.    No murmur heard.  Pulmonary/Chest: Effort normal and breath sounds normal. No respiratory distress. He has no wheezes. He has no rales. He exhibits no tenderness.   Abdominal: Soft. Bowel sounds are normal. He exhibits no distension. There is no tenderness. There is no rebound and no guarding.   Musculoskeletal: Normal range of motion. He exhibits no edema.   Neurological: He is alert and oriented to person, place, and time.   Skin: Skin is warm and dry. He is not diaphoretic. No erythema.   Psychiatric: He has a normal mood and affect. His behavior is normal. Judgment and thought content normal.   Nursing note and vitals reviewed.          Significant Labs: All pertinent labs within the past 24 hours have been reviewed.    Significant Imaging: I have reviewed and interpreted all pertinent imaging results/findings within the past 24 hours.    Assessment/Plan:     * Symptomatic anemia    Patient presented with syncope and worsening anemia today.  His hemoglobin today is 5.2 grams which is decreased from three months ago at 8.7 grams.  He does have a history of esophageal bleeding for which he had to undergo banding and pRBC transfusions, but he has no evidence of esophageal bleeding now.  He is otherwise hemodynamically-stable.  Will start pRBC transfusion and will be careful not to transfuse above a hemoglobin of 8 grams.  Will consult Gastroenterology for further recommendations.        Syncope    I'm not sure if his history if consistent with a seizure, though he could've had a convulsive syncope.  He does abuse alcohol but he said his last alcoholic drink was yesterday, which does not put him in the timeline of a withdrawal seizure.  Will continue to monitor.        Melena    Patient does report a  week-long history of melena without recent use of NSAIDs.  He does have esophageal varies; will consult Gastroenterology for further recommendations.        Essential hypertension    Patient's blood pressure is poorly-controlled; will continue home regimen of furosemide, metoprolol, and spironolactone, and provide as-needed clonidine.        Cirrhosis of liver    As addressed above.        Esophageal varices without bleeding    As addressed above.        Anemia of chronic disease    As addressed above.        Alcohol abuse    He has been counseled on alcohol cessation.  Will provide thiamine, folic acid, and MVI.          VTE Risk Mitigation         Ordered     Medium Risk of VTE  Once      12/31/17 0030     Place sequential compression device  Until discontinued      12/31/17 0030     Place PATRICIA hose  Until discontinued      12/31/17 0030             Total time spent on case: 45 minutes.        Vaishali Call M.D.  Staff Nocturnist  Department of Hospital Medicine  [facility]  Pager: [phone]

## 2017-12-31 NOTE — PLAN OF CARE
12/31/17 1551   Discharge Assessment   Assessment Type Discharge Planning Assessment   Confirmed/corrected address and phone number on facesheet? Yes   Assessment information obtained from? Patient   Prior to hospitilization cognitive status: Alert/Oriented   Prior to hospitalization functional status: Independent   Current cognitive status: Alert/Oriented   Current Functional Status: Independent   Lives With spouse;child(sveta), dependent   Able to Return to Prior Arrangements yes   Is patient able to care for self after discharge? Yes   Patient's perception of discharge disposition admitted as an inpatient;home or selfcare   Readmission Within The Last 30 Days no previous admission in last 30 days   Patient currently being followed by outpatient case management? No   Patient currently receives any other outside agency services? No   Equipment Currently Used at Home none   Do you have any problems affording any of your prescribed medications? No   Is the patient taking medications as prescribed? yes   Does the patient have transportation home? Yes   Transportation Available car;family or friend will provide   Does the patient receive services at the [facility]? No   Discharge Plan A Home with family   Patient/Family In Agreement With Plan yes

## 2017-12-31 NOTE — CONSULTS
"Chief Complaint:  "I had a seizures."    HPI:  The patient is a 37 year old man with a history of alcohol cirrhosis presenting with seizures and anemia.  Information was obtained through the chart and the nurse, who speaks Swedish and translated.  He is well known to the GI Service as he presented twice with anemia.   He underwent an EGD on 8/18/17 and he had grade II esophageal varices s/p banding x 4.  A hepatitis panel at that time was negative and it was suspected that he has alcoholic cirrhosis.  A doppler ultrasound also showed concern for stenosis with occlusion of the superior mesenteric, main portal, and posterior right portal veins.  A CT scan was obtained to better visualize this, but did not provide any further information.  The patient represented with anemia and underwent a repeat EGD on 9/7/17 and showed nonbleeding grade II varices and portal hypertensive gastropathy.  Prior to this admission, he went outside to talk to neighbors.  He had a syncopal episode followed by a seizure, which he has never experience before.  After presenting to the Emergency Room, the patient was found to have a profound anemia.  He denies having any bleeding, however, his wife reports that he had black stools for a week.  He does not have abdominal pain, weight loss, nausea, emesis, diarrhea, or constipation.  The patient does not take NSAIDs.     Past Medical History:   Diagnosis Date    Hypertension     Thyroid disease      History reviewed. No pertinent surgical history.    History reviewed. No pertinent family history.    Social History     Social History    Marital status:      Spouse name: N/A    Number of children: N/A    Years of education: N/A     Occupational History    Not on file.     Social History Main Topics    Smoking status: Never Smoker    Smokeless tobacco: Never Used    Alcohol use 0.6 oz/week     1 Cans of beer per week      Comment: occasional    Drug use: No    Sexual activity: Yes "     Other Topics Concern    Not on file     Social History Narrative    No narrative on file      cefTRIAXone (ROCEPHIN) IVPB  1 g Intravenous Q24H    folic acid  1 mg Oral Daily    furosemide  40 mg Oral Daily    metoclopramide HCl  10 mg Intravenous Q6H    metoprolol tartrate  25 mg Oral BID    multivitamin liquid  5 mL Oral Daily    spironolactone  25 mg Oral Daily    thiamine  100 mg Oral Daily     Review of patient's allergies indicates:  No Known Allergies    ROS:  No chest pain or dyspnea.  No dysuria.  No heartburn or dysphagia.  Otherwise as stated above.  Ten other systems negative.    Vitals:    12/31/17 0059 12/31/17 0200 12/31/17 0310 12/31/17 0400   BP: (!) 174/90 (!) 168/88 (!) 147/80    BP Location:   Right arm    Patient Position:   Lying    Pulse: (!) 112 109 106    Resp: 20 20 20    Temp: 98.9 °F (37.2 °C) 99.4 °F (37.4 °C) 99.6 °F (37.6 °C)    TempSrc: Oral Oral Oral    SpO2: 98% 97% 95%    Weight:    94.8 kg (208 lb 15.9 oz)   Height:         P.E.:  GEN: A x O x 3, NAD  SKIN: No jaundice  HEENT: EOMI, PERRL, anicteric sclera  CV: RRR, no M/R/G  Chest: CTA B  Abdomen: soft, NTND, normoactive BS  Ext: No C/C/E.  2+ dorsalis pedis pulses B  Neuro: No asterixes or tremors.  CN II-XII intact  Musculoskeletal: 5/5 strength bilaterally    Labs:  Recent Results (from the past 336 hour(s))   CBC auto differential    Collection Time: 12/30/17  5:18 PM   Result Value Ref Range    WBC 9.79 3.90 - 12.70 K/uL    Hemoglobin 5.2 (LL) 14.0 - 18.0 g/dL    Hematocrit 16.9 (LL) 40.0 - 54.0 %    Platelets 100 (L) 150 - 350 K/uL     CMP  Sodium   Date Value Ref Range Status   12/30/2017 132 (L) 136 - 145 mmol/L Final     Potassium   Date Value Ref Range Status   12/30/2017 3.4 (L) 3.5 - 5.1 mmol/L Final     Chloride   Date Value Ref Range Status   12/30/2017 101 95 - 110 mmol/L Final     CO2   Date Value Ref Range Status   12/30/2017 21 (L) 23 - 29 mmol/L Final     Glucose   Date Value Ref Range Status    12/30/2017 166 (H) 70 - 110 mg/dL Final     BUN, Bld   Date Value Ref Range Status   12/30/2017 11 6 - 20 mg/dL Final     Creatinine   Date Value Ref Range Status   12/30/2017 0.8 0.5 - 1.4 mg/dL Final     Calcium   Date Value Ref Range Status   12/30/2017 8.6 (L) 8.7 - 10.5 mg/dL Final     Total Protein   Date Value Ref Range Status   12/30/2017 7.7 6.0 - 8.4 g/dL Final     Albumin   Date Value Ref Range Status   12/30/2017 3.2 (L) 3.5 - 5.2 g/dL Final     Total Bilirubin   Date Value Ref Range Status   12/30/2017 1.0 0.1 - 1.0 mg/dL Final     Comment:     For infants and newborns, interpretation of results should be based  on gestational age, weight and in agreement with clinical  observations.  Premature Infant recommended reference ranges:  Up to 24 hours.............<8.0 mg/dL  Up to 48 hours............<12.0 mg/dL  3-5 days..................<15.0 mg/dL  6-29 days.................<15.0 mg/dL       Alkaline Phosphatase   Date Value Ref Range Status   12/30/2017 140 (H) 55 - 135 U/L Final     AST   Date Value Ref Range Status   12/30/2017 103 (H) 10 - 40 U/L Final     ALT   Date Value Ref Range Status   12/30/2017 34 10 - 44 U/L Final     Anion Gap   Date Value Ref Range Status   12/30/2017 10 8 - 16 mmol/L Final     eGFR if    Date Value Ref Range Status   12/30/2017 >60 >60 mL/min/1.73 m^2 Final     eGFR if non    Date Value Ref Range Status   12/30/2017 >60 >60 mL/min/1.73 m^2 Final     Comment:     Calculation used to obtain the estimated glomerular filtration  rate (eGFR) is the CKD-EPI equation.          No results for input(s): PT, INR, APTT in the last 24 hours.    A/P:  The patient is a 37 year old man with a history of alcohol cirrhosis presenting with seizures and anemia.  1.  Anemia - the patient denies having any bleeding, however, his wife believes he may have had melena.  He will be started on a protonix drip, octreotide drip, and ceftriaxone.  He continues to  drink alcohol and alcohol cessation was emphasized.  Reglan will be given to clear out clots.  The patient had chips and coca cola.  He will be made NPO.  He is receiving 2 units of pRBCs and a follow-up H/H will need to be obtained.  The patient should not be transfused above an H/H of 8/24 as this can cause varices to burst.  If he is stable, he can undergo an EGD on Tuesday.  If he has active bleeding but his H/H is above 7/21, an EGD can be performed sooner.  An ultrasound will be obtained to evaluate for ascites and liver lesions.  He should follow a low salt, low fluid diet and diuretics can be continued.  He will be monitored.    Thank you for this consult.

## 2017-12-31 NOTE — CARE UPDATE
Patient seen and examined.  Agree with Dr. Call's treatment and plan  Patient presented with seizure vs syncope.  Noted to be severely anemic.  Heme positive stool.  Transfused with adequate correction of H/H.  Similar presentation on 9/2017 with variceal bleeding.  GI consulted.  Started on Protonix and Octreotide drips.

## 2018-01-01 LAB
ANION GAP SERPL CALC-SCNC: 10 MMOL/L
BASOPHILS # BLD AUTO: 0.03 K/UL
BASOPHILS NFR BLD: 0.3 %
BUN SERPL-MCNC: 12 MG/DL
CALCIUM SERPL-MCNC: 8.4 MG/DL
CHLORIDE SERPL-SCNC: 97 MMOL/L
CO2 SERPL-SCNC: 25 MMOL/L
CREAT SERPL-MCNC: 0.9 MG/DL
DIFFERENTIAL METHOD: ABNORMAL
EOSINOPHIL # BLD AUTO: 0.1 K/UL
EOSINOPHIL NFR BLD: 0.8 %
ERYTHROCYTE [DISTWIDTH] IN BLOOD BY AUTOMATED COUNT: 18.7 %
EST. GFR  (AFRICAN AMERICAN): >60 ML/MIN/1.73 M^2
EST. GFR  (NON AFRICAN AMERICAN): >60 ML/MIN/1.73 M^2
GLUCOSE SERPL-MCNC: 145 MG/DL
HCT VFR BLD AUTO: 24.4 %
HGB BLD-MCNC: 7.6 G/DL
LYMPHOCYTES # BLD AUTO: 1.3 K/UL
LYMPHOCYTES NFR BLD: 12.1 %
MCH RBC QN AUTO: 26.3 PG
MCHC RBC AUTO-ENTMCNC: 31.1 G/DL
MCV RBC AUTO: 84 FL
MONOCYTES # BLD AUTO: 1.5 K/UL
MONOCYTES NFR BLD: 13.6 %
NEUTROPHILS # BLD AUTO: 8 K/UL
NEUTROPHILS NFR BLD: 73.2 %
PLATELET # BLD AUTO: 137 K/UL
PMV BLD AUTO: 10 FL
POTASSIUM SERPL-SCNC: 3.5 MMOL/L
RBC # BLD AUTO: 2.89 M/UL
SODIUM SERPL-SCNC: 132 MMOL/L
WBC # BLD AUTO: 10.92 K/UL

## 2018-01-01 PROCEDURE — 25000003 PHARM REV CODE 250: Performed by: INTERNAL MEDICINE

## 2018-01-01 PROCEDURE — 63600175 PHARM REV CODE 636 W HCPCS: Performed by: INTERNAL MEDICINE

## 2018-01-01 PROCEDURE — 99232 SBSQ HOSP IP/OBS MODERATE 35: CPT | Mod: ,,, | Performed by: PSYCHIATRY & NEUROLOGY

## 2018-01-01 PROCEDURE — 80048 BASIC METABOLIC PNL TOTAL CA: CPT

## 2018-01-01 PROCEDURE — 85025 COMPLETE CBC W/AUTO DIFF WBC: CPT

## 2018-01-01 PROCEDURE — C9113 INJ PANTOPRAZOLE SODIUM, VIA: HCPCS | Performed by: INTERNAL MEDICINE

## 2018-01-01 PROCEDURE — 36415 COLL VENOUS BLD VENIPUNCTURE: CPT

## 2018-01-01 PROCEDURE — 21400001 HC TELEMETRY ROOM

## 2018-01-01 PROCEDURE — 25000003 PHARM REV CODE 250: Performed by: EMERGENCY MEDICINE

## 2018-01-01 RX ADMIN — DEXTROSE 8 MG/HR: 50 INJECTION, SOLUTION INTRAVENOUS at 03:01

## 2018-01-01 RX ADMIN — FUROSEMIDE 40 MG: 40 TABLET ORAL at 08:01

## 2018-01-01 RX ADMIN — CEFTRIAXONE 1 G: 1 INJECTION, SOLUTION INTRAVENOUS at 07:01

## 2018-01-01 RX ADMIN — Medication 5 ML: at 08:01

## 2018-01-01 RX ADMIN — OCTREOTIDE ACETATE 50 MCG/HR: 500 INJECTION, SOLUTION INTRAVENOUS; SUBCUTANEOUS at 06:01

## 2018-01-01 RX ADMIN — DEXTROSE 8 MG/HR: 50 INJECTION, SOLUTION INTRAVENOUS at 04:01

## 2018-01-01 RX ADMIN — METOCLOPRAMIDE 10 MG: 5 INJECTION, SOLUTION INTRAMUSCULAR; INTRAVENOUS at 05:01

## 2018-01-01 RX ADMIN — Medication 100 MG: at 08:01

## 2018-01-01 RX ADMIN — METOPROLOL TARTRATE 25 MG: 25 TABLET, FILM COATED ORAL at 08:01

## 2018-01-01 RX ADMIN — SPIRONOLACTONE 25 MG: 25 TABLET ORAL at 08:01

## 2018-01-01 RX ADMIN — DEXTROSE 8 MG/HR: 50 INJECTION, SOLUTION INTRAVENOUS at 08:01

## 2018-01-01 RX ADMIN — FOLIC ACID 1 MG: 1 TABLET ORAL at 08:01

## 2018-01-01 RX ADMIN — DEXTROSE 8 MG/HR: 50 INJECTION, SOLUTION INTRAVENOUS at 10:01

## 2018-01-01 RX ADMIN — OCTREOTIDE ACETATE 50 MCG/HR: 500 INJECTION, SOLUTION INTRAVENOUS; SUBCUTANEOUS at 08:01

## 2018-01-01 RX ADMIN — METOPROLOL TARTRATE 25 MG: 25 TABLET, FILM COATED ORAL at 06:01

## 2018-01-01 RX ADMIN — CLONIDINE HYDROCHLORIDE 0.1 MG: 0.1 TABLET ORAL at 07:01

## 2018-01-01 NOTE — ASSESSMENT & PLAN NOTE
I'm not sure if his history if consistent with a seizure, though he could've had a convulsive syncope.    He does abuse alcohol but he said his last alcoholic drink was day prior to admission,  which does not put him in the timeline of a withdrawal seizure.  Will continue to monitor.

## 2018-01-01 NOTE — SUBJECTIVE & OBJECTIVE
Interval History: Doing well.  No new complaints.    Review of Systems   HENT: Negative for ear discharge and ear pain.    Eyes: Negative for pain and itching.   Cardiovascular: Negative for chest pain and palpitations.   Endocrine: Negative for polyphagia and polyuria.     Objective:     Vital Signs (Most Recent):  Temp: 98.6 °F (37 °C) (01/01/18 0753)  Pulse: 87 (01/01/18 0753)  Resp: 19 (01/01/18 0753)  BP: (!) 121/57 (01/01/18 0753)  SpO2: 97 % (01/01/18 0753) Vital Signs (24h Range):  Temp:  [98.6 °F (37 °C)-99.9 °F (37.7 °C)] 98.6 °F (37 °C)  Pulse:  [] 87  Resp:  [18-20] 19  SpO2:  [96 %-99 %] 97 %  BP: (121-172)/() 121/57     Weight: 94.8 kg (208 lb 15.9 oz)  Body mass index is 29.99 kg/m².    Intake/Output Summary (Last 24 hours) at 01/01/18 0931  Last data filed at 01/01/18 0831   Gross per 24 hour   Intake            751.5 ml   Output              650 ml   Net            101.5 ml      Physical Exam   Constitutional: He is oriented to person, place, and time. He appears well-developed and well-nourished. No distress.   HENT:   Head: Normocephalic and atraumatic.   Right Ear: External ear normal.   Left Ear: External ear normal.   Nose: Nose normal.   Eyes: Right eye exhibits no discharge. Left eye exhibits no discharge.   Neck: Normal range of motion.   Cardiovascular: Normal rate, regular rhythm, normal heart sounds and intact distal pulses.  Exam reveals no gallop and no friction rub.    No murmur heard.  Pulmonary/Chest: Effort normal and breath sounds normal. No respiratory distress. He has no wheezes. He has no rales. He exhibits no tenderness.   Abdominal: Soft. Bowel sounds are normal. He exhibits no distension. There is no tenderness. There is no rebound and no guarding.   Musculoskeletal: Normal range of motion. He exhibits no edema.   Neurological: He is alert and oriented to person, place, and time.   Skin: Skin is warm and dry. He is not diaphoretic. No erythema.   Psychiatric: He  has a normal mood and affect. His behavior is normal. Judgment and thought content normal.   Nursing note and vitals reviewed.      Significant Labs:   BMP:   Recent Labs  Lab 12/31/17  0701 01/01/18  0520   * 145*   * 132*   K 3.2* 3.5   CL 98 97   CO2 25 25   BUN 8 12   CREATININE 0.8 0.9   CALCIUM 8.3* 8.4*   MG 1.7  --      CBC:   Recent Labs  Lab 12/30/17  1718 12/31/17  0701 12/31/17  0914 01/01/18  0520   WBC 9.79 11.15  --  10.92   HGB 5.2* 7.4* 7.4* 7.6*   HCT 16.9* 23.2* 23.1* 24.4*   * 117*  --  137*       Significant Imaging: I have reviewed all pertinent imaging results/findings within the past 24 hours.

## 2018-01-01 NOTE — PROGRESS NOTES
[facility]  Neurology  Progress Note    Patient Name: Alcides Mac  MRN: [id number]  Admission Date: 12/30/2017  Hospital Length of Stay: 2 days  Code Status: Full Code   Attending Provider: Ziyad Rangel MD  Primary Care Physician: Primary Doctor No   Principal Problem:Symptomatic anemia    Subjective:     Interval History: 38 y/o male with medical Hx as listed below comes to ED after a reported seizure. A friend told him he had a seizure while talking to him. Mr. Mac recalls waking up on the ground with paramedics helping him. No previous episodes. He denies any aggravating or alleviating factors. Pt was admitted as  was found to be markedly anemic. No weakness, numbness, vertigo, visual or speech disturbances.      Hx of heavy EtOH consumption for 12 years (at least one bottle of liquor per day).    -1/1/18: No acute issues. No LOC or seizures.    Current Neurological Medications:     Current Facility-Administered Medications   Medication Dose Route Frequency Provider Last Rate Last Dose    0.9%  NaCl infusion (for blood administration)   Intravenous Q24H PRN Manoj Bassett MD        acetaminophen tablet 500 mg  500 mg Oral Q6H PRN Vaishali Call MD   500 mg at 12/31/17 1510    cefTRIAXone (ROCEPHIN) 1 g in dextrose 5 % 50 mL IVPB  1 g Intravenous Q24H Simone Garcia MD   1 g at 01/01/18 0749    cloNIDine tablet 0.1 mg  0.1 mg Oral TID PRN Vaishali Call MD   0.1 mg at 12/31/17 2709    folic acid tablet 1 mg  1 mg Oral Daily Vaishali Call MD   1 mg at 01/01/18 0834    furosemide tablet 40 mg  40 mg Oral Daily Vaishali Call MD   40 mg at 01/01/18 0834    LORazepam injection 1 mg  1 mg Intravenous Q4H PRN Vaishali Call MD        metoprolol tartrate (LOPRESSOR) tablet 25 mg  25 mg Oral BID Manoj Bassett MD   25 mg at 01/01/18 0834    multivitamin liquid per dose 5 mL  5 mL Oral Daily Vaishali Call MD   5 mL at 01/01/18 0834    octreotide (SANDOSTATIN) 500 mcg in sodium  chloride 0.9% 100 mL infusion  50 mcg/hr Intravenous Continuous Simone Garcia MD 10 mL/hr at 01/01/18 0831 50 mcg/hr at 01/01/18 0831    ondansetron injection 8 mg  8 mg Intravenous Q8H PRN Vaishali Call MD        pantoprazole 40 mg in dextrose 5 % 100 mL infusion (ready to mix system)  8 mg/hr Intravenous Continuous Simone Garcia MD 20 mL/hr at 01/01/18 1004 8 mg/hr at 01/01/18 1004    potassium chloride 10% solution 40 mEq  40 mEq Oral PRN Vaishali Call MD        potassium chloride 10% solution 40 mEq  40 mEq Oral PRN Vaishali Call MD        prochlorperazine injection Soln 5 mg  5 mg Intravenous Q6H PRN Vaishali Call MD        ramelteon tablet 8 mg  8 mg Oral Nightly PRN Vaishali Call MD        spironolactone tablet 25 mg  25 mg Oral Daily Vaishali Call MD   25 mg at 01/01/18 0834    thiamine tablet 100 mg  100 mg Oral Daily Vaishali Call MD   100 mg at 01/01/18 0834       Review of Systems   Constitutional: Negative for fever and unexpected weight change.   HENT: Negative for trouble swallowing and voice change.    Eyes: Negative for photophobia and visual disturbance.   Respiratory: Negative for shortness of breath.    Cardiovascular: Negative for chest pain and palpitations.   Gastrointestinal: Negative for abdominal pain.   Genitourinary: Negative for dysuria.   Musculoskeletal: Negative for back pain.   Neurological:  Negative for dizziness, tremors, seizures, syncope, speech difficulty, light-headedness or headaches.  Psychiatric/Behavioral: Negative for confusion and hallucinations.     Objective:     Vital Signs (Most Recent):  Temp: 98.4 °F (36.9 °C) (01/01/18 1207)  Pulse: 79 (01/01/18 1207)  Resp: 19 (01/01/18 1207)  BP: (!) 144/94 (01/01/18 1207)  SpO2: 100 % (01/01/18 1207) Vital Signs (24h Range):  Temp:  [98.4 °F (36.9 °C)-99.9 °F (37.7 °C)] 98.4 °F (36.9 °C)  Pulse:  [] 79  Resp:  [18-20] 19  SpO2:  [96 %-100 %] 100 %  BP: (121-172)/() 144/94     Weight: 94.8 kg (208 lb  15.9 oz)  Body mass index is 29.99 kg/m².    Physical Exam  Constitutional: Oriented to person, place, and time. Appears well-developed and well-nourished.   HENT:   Head: Normocephalic and atraumatic.   Eyes: EOM are normal. Pupils are equal, round, and reactive to light. Right eye exhibits no discharge. Left eye exhibits no discharge. No scleral icterus.   Neck: Normal range of motion. Neck supple.   Cardiovascular: Normal rate.    Pulmonary/Chest: Breath sounds normal.   Abdominal: Soft. Bowel sounds are normal.   Musculoskeletal: Normal range of motion.   Neurological: Alert        NEUROLOGICAL EXAMINATION:      MENTAL STATUS   Oriented to person, place, and time.   Level of consciousness: alert  No dysarhtria        CRANIAL NERVES      CN II   Visual fields full to confrontation.      CN III, IV, VI   Pupils are equal, round, and reactive to light.  Extraocular motions are normal.   Right pupil: Size: 3 mm.   Left pupil: Size: 3 mm.      CN VII   No facial weakness     CN IX, X   CN IX normal.      CN XI   Right sternocleidomastoid strength: normal  Left sternocleidomastoid strength: normal     CN XII   Tongue deviation: none     MOTOR EXAM   No drift  5/5 on right UE; 5/5 on left side     No coordination difficulties       Significant Labs:   CBC:   Recent Labs  Lab 12/30/17  1718 12/31/17  0701 12/31/17  0914 01/01/18  0520   WBC 9.79 11.15  --  10.92   HGB 5.2* 7.4* 7.4* 7.6*   HCT 16.9* 23.2* 23.1* 24.4*   * 117*  --  137*     CMP:   Recent Labs  Lab 12/30/17  1610 12/31/17  0701 01/01/18  0520   * 133* 145*   * 132* 132*   K 3.4* 3.2* 3.5    98 97   CO2 21* 25 25   BUN 11 8 12   CREATININE 0.8 0.8 0.9   CALCIUM 8.6* 8.3* 8.4*   MG 1.8 1.7  --    PROT 7.7 7.9  --    ALBUMIN 3.2* 3.2*  --    BILITOT 1.0 1.7*  --    ALKPHOS 140* 134  --    * 100*  --    ALT 34 35  --    ANIONGAP 10 9 10   EGFRNONAA >60 >60 >60       Assessment and Plan:     38 y/o male consulted for  seizure     1. Seizure: this could have been a convulsive syncope as pt was markedly anemic.       Hx of heavy EtOH consumption. Watch for signs of EtOH withdrawal.           MRI brain shows no definite etiology for seizures.           -Will follow as needed.    Active Diagnoses:    Diagnosis Date Noted POA    PRINCIPAL PROBLEM:  Symptomatic anemia [D64.9] 12/30/2017 Yes    Syncope [R55] 12/31/2017 Yes    Essential hypertension [I10] 12/31/2017 Yes     Chronic    Cirrhosis of liver [K74.60] 12/31/2017 Yes     Chronic    Esophageal varices without bleeding [I85.00] 12/31/2017 Yes     Chronic    Anemia of chronic disease [D63.8] 12/31/2017 Yes     Chronic    Melena [K92.1] 12/31/2017 Yes    Alcohol abuse [F10.10] 08/18/2017 Yes     Chronic      Problems Resolved During this Admission:    Diagnosis Date Noted Date Resolved POA       VTE Risk Mitigation         Ordered     Medium Risk of VTE  Once      12/31/17 0030     Place sequential compression device  Until discontinued      12/31/17 0030     Place PATRICIA hose  Until discontinued      12/31/17 0030          Trung White MD  Neurology  [facility]

## 2018-01-01 NOTE — HOSPITAL COURSE
36 y/o male presented after loss of consciousness.  Syncope vs possible seizure.  Noted to be severely anemic.  Heme positive stool.  Hx of ETOH abuse with similar admission in past.  Hx of esophageal varices.  Transfused 2 units of blood.  Started on Protonix and Octreotide drips.  GI consulted.  Adequate correction of H/H post transfusion.  H/H then remained stable during hospital stay.  No evidence of bleeding.  EGD showed esophageal varices with no evidence of bleeding.  No seizure or syncope during hospital stay.  He has been counseled on importance of ETOH cessation.  Patient will be discharged home.  Follow up with PCP and GI has been arranged.

## 2018-01-01 NOTE — ASSESSMENT & PLAN NOTE
Patient does report a week-long history of melena without recent use of NSAIDs.    He does have esophageal varies.

## 2018-01-01 NOTE — PLAN OF CARE
Problem: Fall Risk (Adult)  Goal: Absence of Falls  Patient will demonstrate the desired outcomes by discharge/transition of care.   Outcome: Ongoing (interventions implemented as appropriate)   01/01/18 1608   Fall Risk (Adult)   Absence of Falls making progress toward outcome       Problem: Patient Care Overview  Goal: Plan of Care Review  Outcome: Ongoing (interventions implemented as appropriate)   01/01/18 1608   Coping/Psychosocial   Plan Of Care Reviewed With patient       Problem: Pain, Acute (Adult)  Goal: Acceptable Pain Control/Comfort Level  Patient will demonstrate the desired outcomes by discharge/transition of care.   Outcome: Ongoing (interventions implemented as appropriate)   01/01/18 1608   Pain, Acute (Adult)   Acceptable Pain Control/Comfort Level making progress toward outcome       Problem: Alcohol Withdrawal Acute, Risk/Actual (Adult)  Goal: Signs and Symptoms of Listed Potential Problems Will be Absent, Minimized or Managed (Alcohol Withdrawal Acute, Risk/Actual)  Signs and symptoms of listed potential problems will be absent, minimized or managed by discharge/transition of care (reference Alcohol Withdrawal Acute, Risk/Actual (Adult) CPG).   Outcome: Ongoing (interventions implemented as appropriate)   01/01/18 1608   Alcohol Withdrawal Acute, Risk/Actual   Problems Assessed (Alcohol Withdrawal Syndrome) all   Problems Present (Alcohol Withdrawal Syndrome) situational response

## 2018-01-01 NOTE — PLAN OF CARE
Problem: Fall Risk (Adult)  Goal: Absence of Falls  Patient will demonstrate the desired outcomes by discharge/transition of care.   Outcome: Ongoing (interventions implemented as appropriate)   01/01/18 0415   Fall Risk (Adult)   Absence of Falls making progress toward outcome

## 2018-01-01 NOTE — PROGRESS NOTES
[facility] Hospital Medicine  Progress Note    Patient Name: Alcides Mac  MRN: [id number]  Patient Class: IP- Inpatient   Admission Date: 12/30/2017  Length of Stay: 2 days  Attending Physician: Ziyad Rangel MD  Primary Care Provider: Primary Doctor No        Subjective:     Principal Problem:Symptomatic anemia    HPI:  Mr. Alcides Mac is a 37 y.o. male with essential hypertension, cirrhosis of liver, esophageal varices, anemia of chronic disease, and alcohol abuse who presents to [facility] ED with complaints of syncope today.  He had gone out to speak with some of his neighbors when he stood up and lost consciousness.  There were some seizure-like activity per witnesses, and he was confused when he arrived at the ED.  He denies remembering any of these events and had no antecedent chest pain, shortness of breath, palpitations, nausea, vomiting, nor any diaphoresis.  He was found to be very anemic in the ED and does report melena for the past week.  He doesn't have any abdominal pain and doesn't use NSAIDs.  He does have esophageal varies but denies any hematemesis nor coffee-ground emesis.  He admits to drinking alcohol with his last drink being a 16-oz beer yesterday.    Hospital Course:  36 y/o male presented after loss of consciousness.  Syncope vs possible seizure.  Noted to be severely anemic.  Heme positive stool.  Hx of ETOH abuse with similar admission in past.  Hx of esophageal varices.  Transfused 2 units of blood.  Started on Protonix and Octreotide drips.  GI consulted.    Interval History: Doing well.  No new complaints.    Review of Systems   HENT: Negative for ear discharge and ear pain.    Eyes: Negative for pain and itching.   Cardiovascular: Negative for chest pain and palpitations.   Endocrine: Negative for polyphagia and polyuria.     Objective:     Vital Signs (Most Recent):  Temp: 98.6 °F (37 °C) (01/01/18 0753)  Pulse: 87 (01/01/18 0753)  Resp: 19 (01/01/18  0753)  BP: (!) 121/57 (01/01/18 0753)  SpO2: 97 % (01/01/18 0753) Vital Signs (24h Range):  Temp:  [98.6 °F (37 °C)-99.9 °F (37.7 °C)] 98.6 °F (37 °C)  Pulse:  [] 87  Resp:  [18-20] 19  SpO2:  [96 %-99 %] 97 %  BP: (121-172)/() 121/57     Weight: 94.8 kg (208 lb 15.9 oz)  Body mass index is 29.99 kg/m².    Intake/Output Summary (Last 24 hours) at 01/01/18 0931  Last data filed at 01/01/18 0831   Gross per 24 hour   Intake            751.5 ml   Output              650 ml   Net            101.5 ml      Physical Exam   Constitutional: He is oriented to person, place, and time. He appears well-developed and well-nourished. No distress.   HENT:   Head: Normocephalic and atraumatic.   Right Ear: External ear normal.   Left Ear: External ear normal.   Nose: Nose normal.   Eyes: Right eye exhibits no discharge. Left eye exhibits no discharge.   Neck: Normal range of motion.   Cardiovascular: Normal rate, regular rhythm, normal heart sounds and intact distal pulses.  Exam reveals no gallop and no friction rub.    No murmur heard.  Pulmonary/Chest: Effort normal and breath sounds normal. No respiratory distress. He has no wheezes. He has no rales. He exhibits no tenderness.   Abdominal: Soft. Bowel sounds are normal. He exhibits no distension. There is no tenderness. There is no rebound and no guarding.   Musculoskeletal: Normal range of motion. He exhibits no edema.   Neurological: He is alert and oriented to person, place, and time.   Skin: Skin is warm and dry. He is not diaphoretic. No erythema.   Psychiatric: He has a normal mood and affect. His behavior is normal. Judgment and thought content normal.   Nursing note and vitals reviewed.      Significant Labs:   BMP:   Recent Labs  Lab 12/31/17  0701 01/01/18  0520   * 145*   * 132*   K 3.2* 3.5   CL 98 97   CO2 25 25   BUN 8 12   CREATININE 0.8 0.9   CALCIUM 8.3* 8.4*   MG 1.7  --      CBC:   Recent Labs  Lab 12/30/17  1718 12/31/17  0701  12/31/17  0914 01/01/18  0520   WBC 9.79 11.15  --  10.92   HGB 5.2* 7.4* 7.4* 7.6*   HCT 16.9* 23.2* 23.1* 24.4*   * 117*  --  137*       Significant Imaging: I have reviewed all pertinent imaging results/findings within the past 24 hours.    Assessment/Plan:      * Symptomatic anemia    Patient presented with syncope and worsening anemia.    His hemoglobin upon presentation was 5.2 grams which is decreased from three months ago at 8.7 grams.    He does have a history of esophageal bleeding for which he had to undergo banding and pRBC transfusions, but he has no evidence of esophageal bleeding now.    Started on Protonix and Ocreotide drips.  He is otherwise hemodynamically-stable.  Transfused 2 units of blood with adequate correction of H/H.  GI consulted.  Plan for EGD tomorrow.        Melena    Patient does report a week-long history of melena without recent use of NSAIDs.    He does have esophageal varies.        Anemia of chronic disease    As addressed above.        Esophageal varices without bleeding    As addressed above.        Cirrhosis of liver    As addressed above.        Essential hypertension    Patient's blood pressure is poorly-controlled; will continue home regimen of furosemide, metoprolol, and spironolactone, and provide as-needed clonidine.        Syncope    I'm not sure if his history if consistent with a seizure, though he could've had a convulsive syncope.    He does abuse alcohol but he said his last alcoholic drink was day prior to admission,  which does not put him in the timeline of a withdrawal seizure.  Will continue to monitor.        Alcohol abuse    He has been counseled on alcohol cessation.  Will provide thiamine, folic acid, and MVI.          VTE Risk Mitigation         Ordered     Medium Risk of VTE  Once      12/31/17 0030     Place sequential compression device  Until discontinued      12/31/17 0030     Place PATRICIA hose  Until discontinued      12/31/17 0030               Ziyad Rangel MD  Department of Hospital Medicine   [facility]

## 2018-01-01 NOTE — PROGRESS NOTES
"Gastroenterology    CC: Anemia    HPI 37 y.o. male with no complaints this AM.  No overt bleeding.  No abd pain.  No N/V.        Past Medical History:   Diagnosis Date    Hypertension     Thyroid disease          Review of Systems  General ROS: negative for chills, fever  Cardiovascular ROS: no chest pain or dyspnea     Physical Examination  BP (!) 121/57   Pulse 87   Temp 98.6 °F (37 °C)   Resp 19   Ht 5' 10" (1.778 m)   Wt 94.8 kg (208 lb 15.9 oz)   SpO2 97%   BMI 29.99 kg/m²   General appearance: alert, cooperative, no distress  HENT: Normocephalic, atraumatic, neck symmetrical, no nasal discharge   Eyes: conjunctivae/corneas clear, no icterus, EOM's intact  Lungs: resp non-labored  Heart: regular rate   Abdomen: soft, non-tender; ND, no rebound  Extremities: extremities symmetric; no clubbing, cyanosis, or edema  Neurologic: Alert and oriented X 3, MAEW    Labs:  H/H stable post PRBC    Assessment:     ETOH related cirrhosis and severe anemia.  No overt bleeding. ? Recent melena? Post PRBC H/H stable, as are VS.  Hx of esoph varices with banding.      Plan:   - PPI/octreotide ggt and daily rocephin  - EGD tomorrow  - Clears ok today        "

## 2018-01-01 NOTE — ASSESSMENT & PLAN NOTE
Patient presented with syncope and worsening anemia.    His hemoglobin upon presentation was 5.2 grams which is decreased from three months ago at 8.7 grams.    He does have a history of esophageal bleeding for which he had to undergo banding and pRBC transfusions, but he has no evidence of esophageal bleeding now.    Started on Protonix and Ocreotide drips.  He is otherwise hemodynamically-stable.  Transfused 2 units of blood with adequate correction of H/H.  GI consulted.  Plan for EGD tomorrow.

## 2018-01-02 ENCOUNTER — ANESTHESIA EVENT (OUTPATIENT)
Dept: ENDOSCOPY | Facility: HOSPITAL | Age: 38
DRG: 812 | End: 2018-01-02

## 2018-01-02 ENCOUNTER — SURGERY (OUTPATIENT)
Age: 38
End: 2018-01-02

## 2018-01-02 ENCOUNTER — ANESTHESIA (OUTPATIENT)
Dept: ENDOSCOPY | Facility: HOSPITAL | Age: 38
DRG: 812 | End: 2018-01-02

## 2018-01-02 VITALS
OXYGEN SATURATION: 100 % | TEMPERATURE: 98 F | WEIGHT: 209 LBS | HEART RATE: 88 BPM | RESPIRATION RATE: 18 BRPM | SYSTOLIC BLOOD PRESSURE: 148 MMHG | DIASTOLIC BLOOD PRESSURE: 93 MMHG | BODY MASS INDEX: 29.92 KG/M2 | HEIGHT: 70 IN

## 2018-01-02 PROBLEM — K92.1 MELENA: Status: RESOLVED | Noted: 2017-12-31 | Resolved: 2018-01-02

## 2018-01-02 PROBLEM — R55 SYNCOPE: Status: RESOLVED | Noted: 2017-12-31 | Resolved: 2018-01-02

## 2018-01-02 LAB
ANION GAP SERPL CALC-SCNC: 9 MMOL/L
BASOPHILS # BLD AUTO: 0.06 K/UL
BASOPHILS NFR BLD: 0.5 %
BUN SERPL-MCNC: 12 MG/DL
CALCIUM SERPL-MCNC: 8.3 MG/DL
CHLORIDE SERPL-SCNC: 97 MMOL/L
CO2 SERPL-SCNC: 26 MMOL/L
CREAT SERPL-MCNC: 0.9 MG/DL
DIFFERENTIAL METHOD: ABNORMAL
EOSINOPHIL # BLD AUTO: 0.2 K/UL
EOSINOPHIL NFR BLD: 1.6 %
ERYTHROCYTE [DISTWIDTH] IN BLOOD BY AUTOMATED COUNT: 19.7 %
EST. GFR  (AFRICAN AMERICAN): >60 ML/MIN/1.73 M^2
EST. GFR  (NON AFRICAN AMERICAN): >60 ML/MIN/1.73 M^2
GLUCOSE SERPL-MCNC: 123 MG/DL
HCT VFR BLD AUTO: 25 %
HGB BLD-MCNC: 7.9 G/DL
LYMPHOCYTES # BLD AUTO: 1.7 K/UL
LYMPHOCYTES NFR BLD: 15.2 %
MCH RBC QN AUTO: 27.1 PG
MCHC RBC AUTO-ENTMCNC: 31.6 G/DL
MCV RBC AUTO: 86 FL
MONOCYTES # BLD AUTO: 1.8 K/UL
MONOCYTES NFR BLD: 16.6 %
NEUTROPHILS # BLD AUTO: 7.3 K/UL
NEUTROPHILS NFR BLD: 66.1 %
PLATELET # BLD AUTO: 165 K/UL
PMV BLD AUTO: 9.5 FL
POTASSIUM SERPL-SCNC: 3.4 MMOL/L
RBC # BLD AUTO: 2.91 M/UL
SODIUM SERPL-SCNC: 132 MMOL/L
WBC # BLD AUTO: 11.01 K/UL

## 2018-01-02 PROCEDURE — 36415 COLL VENOUS BLD VENIPUNCTURE: CPT

## 2018-01-02 PROCEDURE — 63600175 PHARM REV CODE 636 W HCPCS: Performed by: INTERNAL MEDICINE

## 2018-01-02 PROCEDURE — 0DJ08ZZ INSPECTION OF UPPER INTESTINAL TRACT, VIA NATURAL OR ARTIFICIAL OPENING ENDOSCOPIC: ICD-10-PCS | Performed by: INTERNAL MEDICINE

## 2018-01-02 PROCEDURE — 37000008 HC ANESTHESIA 1ST 15 MINUTES: Performed by: INTERNAL MEDICINE

## 2018-01-02 PROCEDURE — C9113 INJ PANTOPRAZOLE SODIUM, VIA: HCPCS | Performed by: INTERNAL MEDICINE

## 2018-01-02 PROCEDURE — 27201022: Performed by: INTERNAL MEDICINE

## 2018-01-02 PROCEDURE — 25000003 PHARM REV CODE 250: Performed by: INTERNAL MEDICINE

## 2018-01-02 PROCEDURE — 37000009 HC ANESTHESIA EA ADD 15 MINS: Performed by: INTERNAL MEDICINE

## 2018-01-02 PROCEDURE — D9220A PRA ANESTHESIA: Mod: ,,, | Performed by: ANESTHESIOLOGY

## 2018-01-02 PROCEDURE — 43244 EGD VARICES LIGATION: CPT | Performed by: INTERNAL MEDICINE

## 2018-01-02 PROCEDURE — 63600175 PHARM REV CODE 636 W HCPCS: Performed by: NURSE ANESTHETIST, CERTIFIED REGISTERED

## 2018-01-02 PROCEDURE — 80048 BASIC METABOLIC PNL TOTAL CA: CPT

## 2018-01-02 PROCEDURE — 25000003 PHARM REV CODE 250: Performed by: NURSE ANESTHETIST, CERTIFIED REGISTERED

## 2018-01-02 PROCEDURE — 85025 COMPLETE CBC W/AUTO DIFF WBC: CPT

## 2018-01-02 RX ORDER — LIDOCAINE HYDROCHLORIDE 20 MG/ML
INJECTION, SOLUTION EPIDURAL; INFILTRATION; INTRACAUDAL; PERINEURAL
Status: DISCONTINUED
Start: 2018-01-02 | End: 2018-01-02 | Stop reason: HOSPADM

## 2018-01-02 RX ORDER — PROPOFOL 10 MG/ML
VIAL (ML) INTRAVENOUS
Status: DISCONTINUED
Start: 2018-01-02 | End: 2018-01-02 | Stop reason: HOSPADM

## 2018-01-02 RX ORDER — LIDOCAINE HCL/PF 100 MG/5ML
SYRINGE (ML) INTRAVENOUS
Status: DISCONTINUED | OUTPATIENT
Start: 2018-01-02 | End: 2018-01-02

## 2018-01-02 RX ORDER — SPIRONOLACTONE 25 MG/1
25 TABLET ORAL DAILY
Qty: 30 TABLET | Refills: 11 | Status: SHIPPED | OUTPATIENT
Start: 2018-01-02 | End: 2018-02-01

## 2018-01-02 RX ORDER — CARVEDILOL 6.25 MG/1
6.25 TABLET ORAL 2 TIMES DAILY
Status: DISCONTINUED | OUTPATIENT
Start: 2018-01-02 | End: 2018-01-02 | Stop reason: HOSPADM

## 2018-01-02 RX ORDER — PROPOFOL 10 MG/ML
VIAL (ML) INTRAVENOUS
Status: COMPLETED
Start: 2018-01-02 | End: 2018-01-02

## 2018-01-02 RX ORDER — PROPOFOL 10 MG/ML
VIAL (ML) INTRAVENOUS
Status: DISCONTINUED | OUTPATIENT
Start: 2018-01-02 | End: 2018-01-02

## 2018-01-02 RX ORDER — OMEPRAZOLE 20 MG/1
20 TABLET, DELAYED RELEASE ORAL DAILY
Qty: 30 TABLET | Refills: 11 | COMMUNITY
Start: 2018-01-02 | End: 2019-01-02

## 2018-01-02 RX ORDER — FUROSEMIDE 40 MG/1
40 TABLET ORAL DAILY
Qty: 30 TABLET | Refills: 11 | Status: SHIPPED | OUTPATIENT
Start: 2018-01-02 | End: 2018-02-01

## 2018-01-02 RX ORDER — METOPROLOL TARTRATE 25 MG/1
25 TABLET, FILM COATED ORAL 2 TIMES DAILY
Qty: 60 TABLET | Refills: 11 | Status: SHIPPED | OUTPATIENT
Start: 2018-01-02 | End: 2018-02-01

## 2018-01-02 RX ORDER — SODIUM CHLORIDE 9 MG/ML
INJECTION, SOLUTION INTRAVENOUS CONTINUOUS PRN
Status: DISCONTINUED | OUTPATIENT
Start: 2018-01-02 | End: 2018-01-02

## 2018-01-02 RX ADMIN — SPIRONOLACTONE 25 MG: 25 TABLET ORAL at 10:01

## 2018-01-02 RX ADMIN — SODIUM CHLORIDE: 0.9 INJECTION, SOLUTION INTRAVENOUS at 07:01

## 2018-01-02 RX ADMIN — Medication 5 ML: at 10:01

## 2018-01-02 RX ADMIN — FUROSEMIDE 40 MG: 40 TABLET ORAL at 10:01

## 2018-01-02 RX ADMIN — PROPOFOL 40 MG: 10 INJECTION, EMULSION INTRAVENOUS at 08:01

## 2018-01-02 RX ADMIN — OCTREOTIDE ACETATE 50 MCG/HR: 500 INJECTION, SOLUTION INTRAVENOUS; SUBCUTANEOUS at 05:01

## 2018-01-02 RX ADMIN — PROPOFOL 100 MG: 10 INJECTION, EMULSION INTRAVENOUS at 08:01

## 2018-01-02 RX ADMIN — CEFTRIAXONE 1 G: 1 INJECTION, SOLUTION INTRAVENOUS at 10:01

## 2018-01-02 RX ADMIN — Medication 100 MG: at 10:01

## 2018-01-02 RX ADMIN — PROPOFOL 60 MG: 10 INJECTION, EMULSION INTRAVENOUS at 08:01

## 2018-01-02 RX ADMIN — CARVEDILOL 6.25 MG: 6.25 TABLET, FILM COATED ORAL at 10:01

## 2018-01-02 RX ADMIN — LIDOCAINE HYDROCHLORIDE 100 MG: 20 INJECTION, SOLUTION INTRAVENOUS at 08:01

## 2018-01-02 RX ADMIN — FOLIC ACID 1 MG: 1 TABLET ORAL at 10:01

## 2018-01-02 RX ADMIN — DEXTROSE 8 MG/HR: 50 INJECTION, SOLUTION INTRAVENOUS at 01:01

## 2018-01-02 RX ADMIN — PROPOFOL 20 MG: 10 INJECTION, EMULSION INTRAVENOUS at 08:01

## 2018-01-02 NOTE — PLAN OF CARE
Discharge follow-up instructions provided to patient in Romanian. JEFFRY informed nurse Gemini  completed all discharge planning for patient and she could complete her nursing education/discharge with patient when ready. JEFFRY informed nurse Singletary patient requesting AVS to be printed in Romanian.         01/02/18 1422   Final Note   Assessment Type Final Discharge Note   Discharge Disposition Home   Hospital Follow Up  Appt(s) scheduled? Yes   Right Care Referral Info   Post Acute Recommendation No Care

## 2018-01-02 NOTE — NURSING
Bedside Report given to night nurse. Walking rounds completed. Visualized and assessed patient NAD noted. Safety precautions maintained and call light within reach.    Chart check completed.

## 2018-01-02 NOTE — NURSING
Provided patient with d/c instructions and patient verbalized understanding. Patient was escorted off unit for discharge. NAD noted.

## 2018-01-02 NOTE — DISCHARGE SUMMARY
[facility] Hospital Medicine  Discharge Summary      Patient Name: Alcides Mac  MRN: [id number]  Admission Date: 12/30/2017  Hospital Length of Stay: 3 days  Discharge Date and Time:  01/02/2018 12:51 PM  Attending Physician: Ziyad Rangel MD   Discharging Provider: Ziyad Rangel MD  Primary Care Provider: Primary Doctor No      HPI:   Mr. Alcides Mac is a 37 y.o. male with essential hypertension, cirrhosis of liver, esophageal varices, anemia of chronic disease, and alcohol abuse who presents to [facility] ED with complaints of syncope today.  He had gone out to speak with some of his neighbors when he stood up and lost consciousness.  There were some seizure-like activity per witnesses, and he was confused when he arrived at the ED.  He denies remembering any of these events and had no antecedent chest pain, shortness of breath, palpitations, nausea, vomiting, nor any diaphoresis.  He was found to be very anemic in the ED and does report melena for the past week.  He doesn't have any abdominal pain and doesn't use NSAIDs.  He does have esophageal varies but denies any hematemesis nor coffee-ground emesis.  He admits to drinking alcohol with his last drink being a 16-oz beer yesterday.    Procedure(s) (LRB):  ESOPHAGOGASTRODUODENOSCOPY (EGD) (N/A)      Hospital Course:   36 y/o male presented after loss of consciousness.  Syncope vs possible seizure.  Noted to be severely anemic.  Heme positive stool.  Hx of ETOH abuse with similar admission in past.  Hx of esophageal varices.  Transfused 2 units of blood.  Started on Protonix and Octreotide drips.  GI consulted.  Adequate correction of H/H post transfusion.  H/H then remained stable during hospital stay.  No evidence of bleeding.  EGD showed esophageal varices with no evidence of bleeding.  No seizure or syncope during hospital stay.  He has been counseled on importance of ETOH cessation.  Patient will be discharged home.  Follow up  with PCP and GI has been arranged.     Consults:   Consults         Status Ordering Provider     Inpatient consult to Gastroenterology  Once     Provider:  Simone Garcia MD    Acknowledged MIGNON BRIGHT     Inpatient consult to neurology  Once     Provider:  Trung White MD    Completed STEFAN WEIR.          No new Assessment & Plan notes have been filed under this hospital service since the last note was generated.  Service: Hospital Medicine    Final Active Diagnoses:    Diagnosis Date Noted POA    PRINCIPAL PROBLEM:  Symptomatic anemia [D64.9] 12/30/2017 Yes    Essential hypertension [I10] 12/31/2017 Yes     Chronic    Cirrhosis of liver [K74.60] 12/31/2017 Yes     Chronic    Esophageal varices without bleeding [I85.00] 12/31/2017 Yes     Chronic    Anemia of chronic disease [D63.8] 12/31/2017 Yes     Chronic    Alcohol abuse [F10.10] 08/18/2017 Yes     Chronic      Problems Resolved During this Admission:    Diagnosis Date Noted Date Resolved POA    Syncope [R55] 12/31/2017 01/02/2018 Yes    Melena [K92.1] 12/31/2017 01/02/2018 Yes       Discharged Condition: stable    Disposition: Home or Self Care    Follow Up:  Follow-up Information     [facility]. Go on 1/29/2018.    Why:  Outpatient Services, PCP Follow-up Appointment, Please arrive to clinic for 1:30PM  Contact information:  [address] [facility] [address]  [phone]             Nathan Deleon MD. Go on 1/11/2018.    Specialty:  Gastroenterology  Why:  Outpatient Services, GI Follow-up Appointment, Please arrive to clinic for 3:00PM  Contact information:  [address] [place] [address]  [id number]  [facility]  [place] [id number]  [phone]                 Patient Instructions:     Diet Adult Regular     Activity as tolerated     Notify your health care provider if you experience any of the following:  temperature >100.4     Notify your health care provider if you experience any of the  following:  persistent nausea and vomiting or diarrhea     Notify your health care provider if you experience any of the following:  difficulty breathing or increased cough     Notify your health care provider if you experience any of the following:  persistent dizziness, light-headedness, or visual disturbances     Notify your health care provider if you experience any of the following:  increased confusion or weakness         Pending Diagnostic Studies:     None         Medications:  Reconciled Home Medications:   Current Discharge Medication List      START taking these medications    Details   omeprazole (PRILOSEC OTC) 20 MG tablet Take 1 tablet (20 mg total) by mouth once daily.  Qty: 30 tablet, Refills: 11         CONTINUE these medications which have CHANGED    Details   furosemide (LASIX) 40 MG tablet Take 1 tablet (40 mg total) by mouth once daily.  Qty: 30 tablet, Refills: 11      metoprolol tartrate (LOPRESSOR) 25 MG tablet Take 1 tablet (25 mg total) by mouth 2 (two) times daily.  Qty: 60 tablet, Refills: 11      spironolactone (ALDACTONE) 25 MG tablet Take 1 tablet (25 mg total) by mouth once daily.  Qty: 30 tablet, Refills: 11             Indwelling Lines/Drains at time of discharge:   Lines/Drains/Airways          No matching active lines, drains, or airways          Time spent on the discharge of patient: >30 minutes  Patient was seen and examined on the date of discharge and determined to be suitable for discharge.         Ziyad Rangel MD  Department of Hospital Medicine  [facility]

## 2018-01-02 NOTE — PROGRESS NOTES
SW met with patient to discuss discharge plan, SW utilized language line to interpret Maltese, Eli ID# [id number] interpreted for patient, SW informed patient he will need PCP follow-up and inquired if patient would like an appointment with [facility]. Patient informed SW he's already an established patient with [facility] and prefers afternoon doctor appointments. SW contacted [facility] @ [phone] and spoke to Sangita to schedule follow-up, patient will see HARPAL Moore on 1/29/18 @ 1:30pm. JEFFRY contacted [facility] @ [phone] to schedule GI follow-up, SW spoke to Barbara that reported appointment will be with Dr. Deleon on 1/11/18 @ 3:00pm. Patient prefers to have AVS printed in Maltese.

## 2018-01-02 NOTE — ANESTHESIA POSTPROCEDURE EVALUATION
"Anesthesia Post Evaluation    Patient: Alcides Mac    Procedure(s) Performed: Procedure(s) (LRB):  ESOPHAGOGASTRODUODENOSCOPY (EGD) (N/A)    Final Anesthesia Type: general  Patient location during evaluation: GI PACU  Patient participation: Yes- Able to Participate  Level of consciousness: awake and alert, awake and oriented  Post-procedure vital signs: reviewed and stable  Pain management: adequate  Airway patency: patent  PONV status at discharge: No PONV  Anesthetic complications: no      Cardiovascular status: blood pressure returned to baseline and hemodynamically stable  Respiratory status: unassisted, spontaneous ventilation and room air  Hydration status: euvolemic  Follow-up not needed.        Visit Vitals  BP (!) 148/93 (BP Location: Right arm, Patient Position: Sitting)   Pulse 88   Temp 36.8 °C (98.3 °F) (Oral)   Resp 18   Ht 5' 10" (1.778 m)   Wt 94.8 kg (208 lb 15.9 oz)   SpO2 100%   BMI 29.99 kg/m²       Pain/Kike Score: Pain Assessment Performed: Yes (1/2/2018 12:30 PM)  Presence of Pain: denies (1/2/2018 12:30 PM)  Kike Score: 10 (1/2/2018  8:45 AM)      "

## 2018-01-02 NOTE — ANESTHESIA PREPROCEDURE EVALUATION
01/02/2018  Alcides Mac is a 37 y.o., male.    Anesthesia Evaluation    I have reviewed the Patient Summary Reports.     I have reviewed the Medications.     Review of Systems  Anesthesia Hx:  No problems with previous Anesthesia    Social:  Alcohol Use, Non-Smoker    Hematology/Oncology:         -- Anemia:   Cardiovascular:   Hypertension    Hepatic/GI:   Liver Disease,    Endocrine:   Hypothyroidism        Physical Exam  General:  Well nourished    Airway/Jaw/Neck:  Airway Findings: Mouth Opening: Normal Tongue: Normal  General Airway Assessment: Adult  Mallampati: II  TM Distance: Normal, at least 6 cm  Jaw/Neck Findings:  Neck ROM: Normal ROM      Dental:  Dental Findings: In tact   Chest/Lungs:  Chest/Lungs Findings: Clear to auscultation, Normal Respiratory Rate     Heart/Vascular:  Heart Findings: Rate: Normal        Mental Status:  Mental Status Findings:  Cooperative, Alert and Oriented         Anesthesia Plan  Type of Anesthesia, risks & benefits discussed:  Anesthesia Type:  general  Patient's Preference:   Intra-op Monitoring Plan: standard ASA monitors  Intra-op Monitoring Plan Comments:   Post Op Pain Control Plan: multimodal analgesia, IV/PO Opioids PRN and per primary service following discharge from PACU  Post Op Pain Control Plan Comments:   Induction:   IV  Beta Blocker:  Patient is on a Beta-Blocker and has received one dose within the past 24 hours (No further documentation required).       Informed Consent: Patient understands risks and agrees with Anesthesia plan.  Questions answered. Anesthesia consent signed with patient.  ASA Score: 3     Day of Surgery Review of History & Physical:    H&P update referred to the surgeon.         Ready For Surgery From Anesthesia Perspective.

## 2018-01-02 NOTE — TRANSFER OF CARE
"Anesthesia Transfer of Care Note    Patient: Alcides Mac    Procedure(s) Performed: Procedure(s) (LRB):  ESOPHAGOGASTRODUODENOSCOPY (EGD) (N/A)    Patient location: GI    Anesthesia Type: general    Transport from OR: Transported from OR on room air with adequate spontaneous ventilation    Post pain: adequate analgesia    Post assessment: no apparent anesthetic complications    Post vital signs: stable    Level of consciousness: awake    Nausea/Vomiting: no nausea/vomiting    Complications: none    Transfer of care protocol was followed      Last vitals:   Visit Vitals  /62 (BP Location: Right arm, Patient Position: Lying)   Pulse 88   Temp 36 °C (96.8 °F) (Skin)   Resp 20   Ht 5' 10" (1.778 m)   Wt 94.8 kg (208 lb 15.9 oz)   SpO2 100%   BMI 29.99 kg/m²     "

## 2018-01-02 NOTE — DISCHARGE SUMMARY
[facility]  Discharge Summary      Admit Date: 12/30/2017    Discharge Date and Time:  01/02/2018 8:14 AM    Attending Physician: Ziyad Rangel MD     Reason for Admission: Seizures    Procedures Performed: Procedure(s) (LRB):  ESOPHAGOGASTRODUODENOSCOPY (EGD) (N/A)    Hospital Course (synopsis of major diagnoses, care, treatment, and services provided during the course of the hospital stay): Ongoing     Consults: GI    Significant Diagnostic Studies: EGD    Final Diagnoses:    Principal Problem: Symptomatic anemia   Secondary Diagnoses: Possible Melena    Discharged Condition: good    Disposition: Admitted as an Inpatient    Follow Up/Patient Instructions: Follow-up with referring physician             Resume previous diet and activity.    Medications:  Transfer Medications (for Discharge Readmit only):   Current Facility-Administered Medications   Medication Dose Route Frequency Provider Last Rate Last Dose    0.9%  NaCl infusion (for blood administration)   Intravenous Q24H PRN Manoj Bassett MD        acetaminophen tablet 500 mg  500 mg Oral Q6H PRN Vaishali Call MD   500 mg at 12/31/17 1510    cefTRIAXone (ROCEPHIN) 1 g in dextrose 5 % 50 mL IVPB  1 g Intravenous Q24H Simone Garcia MD   1 g at 01/01/18 0749    cloNIDine tablet 0.1 mg  0.1 mg Oral TID PRN Vaishali Call MD   0.1 mg at 01/01/18 1937    folic acid tablet 1 mg  1 mg Oral Daily Vaishali Call MD   1 mg at 01/01/18 0834    furosemide tablet 40 mg  40 mg Oral Daily Vaishali Call MD   40 mg at 01/01/18 0834    lidocaine  20 mg/mL (2%) 20 mg/mL (2 %) injection             LORazepam injection 1 mg  1 mg Intravenous Q4H PRN Vaishali Call MD        metoprolol tartrate (LOPRESSOR) tablet 25 mg  25 mg Oral BID Manoj Bassett MD   25 mg at 01/01/18 1800    multivitamin liquid per dose 5 mL  5 mL Oral Daily Vaishali Call MD   5 mL at 01/01/18 0834    octreotide (SANDOSTATIN) 500 mcg in sodium chloride 0.9% 100 mL infusion   50 mcg/hr Intravenous Continuous Simone Garcia MD 10 mL/hr at 01/02/18 0545 50 mcg/hr at 01/02/18 0545    ondansetron injection 8 mg  8 mg Intravenous Q8H PRN Vaishali Call MD        pantoprazole 40 mg in dextrose 5 % 100 mL infusion (ready to mix system)  8 mg/hr Intravenous Continuous Simone Garcia MD 20 mL/hr at 01/02/18 0129 8 mg/hr at 01/02/18 0129    potassium chloride 10% solution 40 mEq  40 mEq Oral PRN Vaishali Call MD        potassium chloride 10% solution 40 mEq  40 mEq Oral PRN Vaishali Call MD        prochlorperazine injection Soln 5 mg  5 mg Intravenous Q6H PRN Vaishali Call MD        ramelteon tablet 8 mg  8 mg Oral Nightly PRN Vaishali Call MD        spironolactone tablet 25 mg  25 mg Oral Daily Vaishali Call MD   25 mg at 01/01/18 0834    thiamine tablet 100 mg  100 mg Oral Daily Vaishali Call MD   100 mg at 01/01/18 0834     Facility-Administered Medications Ordered in Other Encounters   Medication Dose Route Frequency Provider Last Rate Last Dose    0.9%  NaCl infusion    Continuous PRN Ingrid Hughes CRNA        lidocaine (cardiac) injection    PRN Ingrid Hughes CRNA   100 mg at 01/02/18 0800    propofol (DIPRIVAN) 10 mg/mL infusion    PRN Ingrid Hughes CRNA   40 mg at 01/02/18 0810     No discharge procedures on file.

## 2019-09-09 NOTE — NURSING
Arrived back to floor from procedure. NAD noted.   Patient w/ ESRD, Dialysis MWF.  -nephrology aware, will dialyze tomorrow    #HLD  -c/w atorvastatin